# Patient Record
Sex: MALE | Race: WHITE | Employment: OTHER | URBAN - METROPOLITAN AREA
[De-identification: names, ages, dates, MRNs, and addresses within clinical notes are randomized per-mention and may not be internally consistent; named-entity substitution may affect disease eponyms.]

---

## 2017-02-28 ENCOUNTER — HOSPITAL ENCOUNTER (OUTPATIENT)
Dept: WOUND CARE | Age: 82
Discharge: HOME | End: 2017-02-28
Attending: PHYSICAL MEDICINE & REHABILITATION
Payer: MEDICARE

## 2017-02-28 PROCEDURE — 99214 OFFICE O/P EST MOD 30 MIN: CPT

## 2017-02-28 PROCEDURE — 11056 PARNG/CUTG B9 HYPRKR LES 2-4: CPT

## 2018-06-01 PROBLEM — L97.509 FOOT ULCER (HCC): Status: ACTIVE | Noted: 2018-06-01

## 2018-06-19 ENCOUNTER — HOSPITAL ENCOUNTER (OUTPATIENT)
Dept: WOUND CARE | Age: 83
Discharge: HOME | End: 2018-06-19
Attending: PHYSICAL MEDICINE & REHABILITATION
Payer: MEDICARE

## 2018-06-19 PROCEDURE — 99214 OFFICE O/P EST MOD 30 MIN: CPT

## 2019-04-09 PROBLEM — L97.509 FOOT ULCER (HCC): Status: RESOLVED | Noted: 2018-06-01 | Resolved: 2019-04-09

## 2020-08-01 ENCOUNTER — HOME HEALTH ADMISSION (OUTPATIENT)
Dept: HOME HEALTH SERVICES | Facility: HOME HEALTH | Age: 85
End: 2020-08-01
Payer: MEDICARE

## 2020-08-04 ENCOUNTER — HOME CARE VISIT (OUTPATIENT)
Dept: SCHEDULING | Facility: HOME HEALTH | Age: 85
End: 2020-08-04
Payer: MEDICARE

## 2020-08-04 VITALS
TEMPERATURE: 98.2 F | DIASTOLIC BLOOD PRESSURE: 70 MMHG | SYSTOLIC BLOOD PRESSURE: 128 MMHG | HEART RATE: 80 BPM | OXYGEN SATURATION: 98 % | RESPIRATION RATE: 16 BRPM

## 2020-08-04 PROCEDURE — 400013 HH SOC

## 2020-08-04 PROCEDURE — 3331090001 HH PPS REVENUE CREDIT

## 2020-08-04 PROCEDURE — 3331090002 HH PPS REVENUE DEBIT

## 2020-08-04 PROCEDURE — G0299 HHS/HOSPICE OF RN EA 15 MIN: HCPCS

## 2020-08-05 PROCEDURE — 3331090001 HH PPS REVENUE CREDIT

## 2020-08-05 PROCEDURE — 3331090002 HH PPS REVENUE DEBIT

## 2020-08-06 PROCEDURE — 3331090002 HH PPS REVENUE DEBIT

## 2020-08-06 PROCEDURE — 3331090001 HH PPS REVENUE CREDIT

## 2020-08-07 ENCOUNTER — HOME CARE VISIT (OUTPATIENT)
Dept: SCHEDULING | Facility: HOME HEALTH | Age: 85
End: 2020-08-07
Payer: MEDICARE

## 2020-08-07 VITALS
HEART RATE: 75 BPM | OXYGEN SATURATION: 98 % | TEMPERATURE: 98.2 F | SYSTOLIC BLOOD PRESSURE: 130 MMHG | DIASTOLIC BLOOD PRESSURE: 80 MMHG | RESPIRATION RATE: 17 BRPM

## 2020-08-07 PROCEDURE — 3331090002 HH PPS REVENUE DEBIT

## 2020-08-07 PROCEDURE — 3331090001 HH PPS REVENUE CREDIT

## 2020-08-07 PROCEDURE — G0299 HHS/HOSPICE OF RN EA 15 MIN: HCPCS

## 2020-08-08 PROCEDURE — 3331090001 HH PPS REVENUE CREDIT

## 2020-08-08 PROCEDURE — 3331090002 HH PPS REVENUE DEBIT

## 2020-08-09 PROCEDURE — 3331090001 HH PPS REVENUE CREDIT

## 2020-08-09 PROCEDURE — 3331090002 HH PPS REVENUE DEBIT

## 2020-08-10 PROCEDURE — 3331090001 HH PPS REVENUE CREDIT

## 2020-08-10 PROCEDURE — 3331090002 HH PPS REVENUE DEBIT

## 2020-08-11 ENCOUNTER — HOME CARE VISIT (OUTPATIENT)
Dept: SCHEDULING | Facility: HOME HEALTH | Age: 85
End: 2020-08-11
Payer: MEDICARE

## 2020-08-11 VITALS
SYSTOLIC BLOOD PRESSURE: 114 MMHG | DIASTOLIC BLOOD PRESSURE: 72 MMHG | RESPIRATION RATE: 16 BRPM | TEMPERATURE: 98.4 F | HEART RATE: 64 BPM

## 2020-08-11 PROCEDURE — G0299 HHS/HOSPICE OF RN EA 15 MIN: HCPCS

## 2020-08-11 PROCEDURE — 3331090002 HH PPS REVENUE DEBIT

## 2020-08-11 PROCEDURE — G0151 HHCP-SERV OF PT,EA 15 MIN: HCPCS

## 2020-08-11 PROCEDURE — 3331090001 HH PPS REVENUE CREDIT

## 2020-08-12 ENCOUNTER — HOME CARE VISIT (OUTPATIENT)
Dept: SCHEDULING | Facility: HOME HEALTH | Age: 85
End: 2020-08-12
Payer: MEDICARE

## 2020-08-12 PROCEDURE — 3331090001 HH PPS REVENUE CREDIT

## 2020-08-12 PROCEDURE — 3331090002 HH PPS REVENUE DEBIT

## 2020-08-12 PROCEDURE — G0155 HHCP-SVS OF CSW,EA 15 MIN: HCPCS

## 2020-08-13 PROCEDURE — 3331090002 HH PPS REVENUE DEBIT

## 2020-08-13 PROCEDURE — 3331090001 HH PPS REVENUE CREDIT

## 2020-08-14 ENCOUNTER — HOME CARE VISIT (OUTPATIENT)
Dept: SCHEDULING | Facility: HOME HEALTH | Age: 85
End: 2020-08-14
Payer: MEDICARE

## 2020-08-14 VITALS
HEART RATE: 73 BPM | TEMPERATURE: 97.3 F | RESPIRATION RATE: 18 BRPM | SYSTOLIC BLOOD PRESSURE: 124 MMHG | DIASTOLIC BLOOD PRESSURE: 66 MMHG

## 2020-08-14 PROCEDURE — G0157 HHC PT ASSISTANT EA 15: HCPCS

## 2020-08-14 PROCEDURE — G0299 HHS/HOSPICE OF RN EA 15 MIN: HCPCS

## 2020-08-14 PROCEDURE — 3331090001 HH PPS REVENUE CREDIT

## 2020-08-14 PROCEDURE — 3331090002 HH PPS REVENUE DEBIT

## 2020-08-15 PROCEDURE — 3331090002 HH PPS REVENUE DEBIT

## 2020-08-15 PROCEDURE — 3331090001 HH PPS REVENUE CREDIT

## 2020-08-16 PROCEDURE — 3331090002 HH PPS REVENUE DEBIT

## 2020-08-16 PROCEDURE — 3331090001 HH PPS REVENUE CREDIT

## 2020-08-17 ENCOUNTER — HOME CARE VISIT (OUTPATIENT)
Dept: SCHEDULING | Facility: HOME HEALTH | Age: 85
End: 2020-08-17
Payer: MEDICARE

## 2020-08-17 VITALS
TEMPERATURE: 97.8 F | DIASTOLIC BLOOD PRESSURE: 68 MMHG | RESPIRATION RATE: 18 BRPM | SYSTOLIC BLOOD PRESSURE: 104 MMHG | HEART RATE: 66 BPM

## 2020-08-17 PROCEDURE — G0157 HHC PT ASSISTANT EA 15: HCPCS

## 2020-08-17 PROCEDURE — G0299 HHS/HOSPICE OF RN EA 15 MIN: HCPCS

## 2020-08-17 PROCEDURE — 3331090001 HH PPS REVENUE CREDIT

## 2020-08-17 PROCEDURE — 3331090002 HH PPS REVENUE DEBIT

## 2020-08-18 VITALS
SYSTOLIC BLOOD PRESSURE: 106 MMHG | DIASTOLIC BLOOD PRESSURE: 70 MMHG | HEART RATE: 78 BPM | TEMPERATURE: 98 F | OXYGEN SATURATION: 99 % | RESPIRATION RATE: 18 BRPM

## 2020-08-18 PROCEDURE — 3331090002 HH PPS REVENUE DEBIT

## 2020-08-18 PROCEDURE — 3331090001 HH PPS REVENUE CREDIT

## 2020-08-19 ENCOUNTER — HOME CARE VISIT (OUTPATIENT)
Dept: SCHEDULING | Facility: HOME HEALTH | Age: 85
End: 2020-08-19
Payer: MEDICARE

## 2020-08-19 VITALS
RESPIRATION RATE: 18 BRPM | SYSTOLIC BLOOD PRESSURE: 120 MMHG | TEMPERATURE: 98.4 F | DIASTOLIC BLOOD PRESSURE: 72 MMHG | HEART RATE: 72 BPM

## 2020-08-19 PROCEDURE — G0157 HHC PT ASSISTANT EA 15: HCPCS

## 2020-08-19 PROCEDURE — 3331090001 HH PPS REVENUE CREDIT

## 2020-08-19 PROCEDURE — 3331090002 HH PPS REVENUE DEBIT

## 2020-08-20 PROCEDURE — 3331090001 HH PPS REVENUE CREDIT

## 2020-08-20 PROCEDURE — 3331090002 HH PPS REVENUE DEBIT

## 2020-08-21 PROCEDURE — 3331090001 HH PPS REVENUE CREDIT

## 2020-08-21 PROCEDURE — 3331090002 HH PPS REVENUE DEBIT

## 2020-08-22 PROCEDURE — 3331090001 HH PPS REVENUE CREDIT

## 2020-08-22 PROCEDURE — 3331090002 HH PPS REVENUE DEBIT

## 2020-08-23 PROCEDURE — 3331090002 HH PPS REVENUE DEBIT

## 2020-08-23 PROCEDURE — 3331090001 HH PPS REVENUE CREDIT

## 2020-08-24 ENCOUNTER — HOME CARE VISIT (OUTPATIENT)
Dept: SCHEDULING | Facility: HOME HEALTH | Age: 85
End: 2020-08-24
Payer: MEDICARE

## 2020-08-24 VITALS
RESPIRATION RATE: 18 BRPM | SYSTOLIC BLOOD PRESSURE: 124 MMHG | DIASTOLIC BLOOD PRESSURE: 76 MMHG | HEART RATE: 68 BPM | OXYGEN SATURATION: 97 % | TEMPERATURE: 97.8 F

## 2020-08-24 PROCEDURE — 3331090001 HH PPS REVENUE CREDIT

## 2020-08-24 PROCEDURE — 3331090002 HH PPS REVENUE DEBIT

## 2020-08-24 PROCEDURE — G0153 HHCP-SVS OF S/L PATH,EA 15MN: HCPCS

## 2020-08-25 PROCEDURE — 3331090001 HH PPS REVENUE CREDIT

## 2020-08-25 PROCEDURE — 3331090002 HH PPS REVENUE DEBIT

## 2020-08-26 ENCOUNTER — HOME CARE VISIT (OUTPATIENT)
Dept: SCHEDULING | Facility: HOME HEALTH | Age: 85
End: 2020-08-26
Payer: MEDICARE

## 2020-08-26 VITALS
RESPIRATION RATE: 16 BRPM | SYSTOLIC BLOOD PRESSURE: 120 MMHG | HEART RATE: 72 BPM | DIASTOLIC BLOOD PRESSURE: 74 MMHG | TEMPERATURE: 97.5 F | OXYGEN SATURATION: 97 %

## 2020-08-26 PROCEDURE — 3331090001 HH PPS REVENUE CREDIT

## 2020-08-26 PROCEDURE — 3331090002 HH PPS REVENUE DEBIT

## 2020-08-26 PROCEDURE — G0153 HHCP-SVS OF S/L PATH,EA 15MN: HCPCS

## 2020-08-27 ENCOUNTER — HOME CARE VISIT (OUTPATIENT)
Dept: HOME HEALTH SERVICES | Facility: HOME HEALTH | Age: 85
End: 2020-08-27
Payer: MEDICARE

## 2020-08-27 ENCOUNTER — HOME CARE VISIT (OUTPATIENT)
Dept: SCHEDULING | Facility: HOME HEALTH | Age: 85
End: 2020-08-27
Payer: MEDICARE

## 2020-08-27 VITALS
HEART RATE: 72 BPM | RESPIRATION RATE: 18 BRPM | DIASTOLIC BLOOD PRESSURE: 82 MMHG | SYSTOLIC BLOOD PRESSURE: 132 MMHG | TEMPERATURE: 97.7 F

## 2020-08-27 VITALS
SYSTOLIC BLOOD PRESSURE: 130 MMHG | HEART RATE: 72 BPM | OXYGEN SATURATION: 98 % | RESPIRATION RATE: 18 BRPM | DIASTOLIC BLOOD PRESSURE: 82 MMHG | TEMPERATURE: 97.4 F

## 2020-08-27 PROCEDURE — G0157 HHC PT ASSISTANT EA 15: HCPCS

## 2020-08-27 PROCEDURE — 3331090001 HH PPS REVENUE CREDIT

## 2020-08-27 PROCEDURE — G0299 HHS/HOSPICE OF RN EA 15 MIN: HCPCS

## 2020-08-27 PROCEDURE — 3331090002 HH PPS REVENUE DEBIT

## 2020-08-28 PROCEDURE — 3331090002 HH PPS REVENUE DEBIT

## 2020-08-28 PROCEDURE — 3331090001 HH PPS REVENUE CREDIT

## 2020-08-29 PROCEDURE — 3331090001 HH PPS REVENUE CREDIT

## 2020-08-29 PROCEDURE — 3331090002 HH PPS REVENUE DEBIT

## 2020-08-30 PROCEDURE — 3331090002 HH PPS REVENUE DEBIT

## 2020-08-30 PROCEDURE — 3331090001 HH PPS REVENUE CREDIT

## 2020-08-31 PROCEDURE — 3331090002 HH PPS REVENUE DEBIT

## 2020-08-31 PROCEDURE — 3331090001 HH PPS REVENUE CREDIT

## 2020-09-01 PROCEDURE — 3331090001 HH PPS REVENUE CREDIT

## 2020-09-01 PROCEDURE — 3331090002 HH PPS REVENUE DEBIT

## 2020-09-02 ENCOUNTER — HOME CARE VISIT (OUTPATIENT)
Dept: SCHEDULING | Facility: HOME HEALTH | Age: 85
End: 2020-09-02
Payer: MEDICARE

## 2020-09-02 VITALS
RESPIRATION RATE: 18 BRPM | SYSTOLIC BLOOD PRESSURE: 120 MMHG | HEART RATE: 70 BPM | TEMPERATURE: 97.8 F | OXYGEN SATURATION: 98 % | DIASTOLIC BLOOD PRESSURE: 64 MMHG

## 2020-09-02 PROCEDURE — G0299 HHS/HOSPICE OF RN EA 15 MIN: HCPCS

## 2020-09-02 PROCEDURE — 3331090002 HH PPS REVENUE DEBIT

## 2020-09-02 PROCEDURE — 3331090001 HH PPS REVENUE CREDIT

## 2020-09-03 ENCOUNTER — HOME CARE VISIT (OUTPATIENT)
Dept: SCHEDULING | Facility: HOME HEALTH | Age: 85
End: 2020-09-03
Payer: MEDICARE

## 2020-09-03 VITALS
HEART RATE: 70 BPM | DIASTOLIC BLOOD PRESSURE: 76 MMHG | SYSTOLIC BLOOD PRESSURE: 132 MMHG | RESPIRATION RATE: 16 BRPM | TEMPERATURE: 98.2 F

## 2020-09-03 PROCEDURE — 3331090002 HH PPS REVENUE DEBIT

## 2020-09-03 PROCEDURE — 400013 HH SOC

## 2020-09-03 PROCEDURE — 3331090001 HH PPS REVENUE CREDIT

## 2020-09-03 PROCEDURE — G0151 HHCP-SERV OF PT,EA 15 MIN: HCPCS

## 2020-09-04 PROCEDURE — 3331090001 HH PPS REVENUE CREDIT

## 2020-09-04 PROCEDURE — 3331090002 HH PPS REVENUE DEBIT

## 2020-09-05 PROCEDURE — 3331090001 HH PPS REVENUE CREDIT

## 2020-09-05 PROCEDURE — 3331090002 HH PPS REVENUE DEBIT

## 2020-09-06 PROCEDURE — 3331090002 HH PPS REVENUE DEBIT

## 2020-09-06 PROCEDURE — 3331090001 HH PPS REVENUE CREDIT

## 2020-09-07 PROCEDURE — 3331090002 HH PPS REVENUE DEBIT

## 2020-09-07 PROCEDURE — 3331090001 HH PPS REVENUE CREDIT

## 2020-09-08 PROCEDURE — 3331090001 HH PPS REVENUE CREDIT

## 2020-09-08 PROCEDURE — 3331090002 HH PPS REVENUE DEBIT

## 2020-09-09 ENCOUNTER — HOME CARE VISIT (OUTPATIENT)
Dept: SCHEDULING | Facility: HOME HEALTH | Age: 85
End: 2020-09-09
Payer: MEDICARE

## 2020-09-09 PROCEDURE — 3331090002 HH PPS REVENUE DEBIT

## 2020-09-09 PROCEDURE — 3331090001 HH PPS REVENUE CREDIT

## 2020-09-09 PROCEDURE — G0299 HHS/HOSPICE OF RN EA 15 MIN: HCPCS

## 2021-04-11 ENCOUNTER — APPOINTMENT (OUTPATIENT)
Dept: GENERAL RADIOLOGY | Age: 86
End: 2021-04-11
Attending: PHYSICIAN ASSISTANT
Payer: MEDICARE

## 2021-04-11 ENCOUNTER — APPOINTMENT (OUTPATIENT)
Dept: CT IMAGING | Age: 86
End: 2021-04-11
Attending: EMERGENCY MEDICINE
Payer: MEDICARE

## 2021-04-11 ENCOUNTER — HOSPITAL ENCOUNTER (EMERGENCY)
Age: 86
Discharge: HOME | End: 2021-04-11
Attending: EMERGENCY MEDICINE
Payer: MEDICARE

## 2021-04-11 VITALS
HEIGHT: 72 IN | OXYGEN SATURATION: 97 % | HEART RATE: 108 BPM | SYSTOLIC BLOOD PRESSURE: 117 MMHG | WEIGHT: 160 LBS | DIASTOLIC BLOOD PRESSURE: 74 MMHG | RESPIRATION RATE: 16 BRPM | BODY MASS INDEX: 21.67 KG/M2 | TEMPERATURE: 98.3 F

## 2021-04-11 DIAGNOSIS — K59.00 CONSTIPATION, UNSPECIFIED CONSTIPATION TYPE: Primary | ICD-10-CM

## 2021-04-11 LAB
ALBUMIN SERPL-MCNC: 3.3 G/DL (ref 3.2–4.6)
ALBUMIN/GLOB SERPL: 0.9 {RATIO} (ref 1.2–3.5)
ALP SERPL-CCNC: 92 U/L (ref 50–136)
ALT SERPL-CCNC: 26 U/L (ref 12–65)
ANION GAP SERPL CALC-SCNC: 7 MMOL/L (ref 7–16)
AST SERPL-CCNC: 32 U/L (ref 15–37)
BASOPHILS # BLD: 0.1 K/UL (ref 0–0.2)
BASOPHILS NFR BLD: 1 % (ref 0–2)
BILIRUB DIRECT SERPL-MCNC: 0.1 MG/DL
BILIRUB SERPL-MCNC: 0.5 MG/DL (ref 0.2–1.1)
BUN SERPL-MCNC: 25 MG/DL (ref 8–23)
CALCIUM SERPL-MCNC: 8.8 MG/DL (ref 8.3–10.4)
CHLORIDE SERPL-SCNC: 106 MMOL/L (ref 98–107)
CO2 SERPL-SCNC: 25 MMOL/L (ref 21–32)
CREAT SERPL-MCNC: 0.83 MG/DL (ref 0.8–1.5)
DIFFERENTIAL METHOD BLD: ABNORMAL
EOSINOPHIL # BLD: 0.1 K/UL (ref 0–0.8)
EOSINOPHIL NFR BLD: 1 % (ref 0.5–7.8)
ERYTHROCYTE [DISTWIDTH] IN BLOOD BY AUTOMATED COUNT: 13.7 % (ref 11.9–14.6)
GLOBULIN SER CALC-MCNC: 3.8 G/DL (ref 2.3–3.5)
GLUCOSE SERPL-MCNC: 97 MG/DL (ref 65–100)
HCT VFR BLD AUTO: 34.2 % (ref 41.1–50.3)
HGB BLD-MCNC: 11.3 G/DL (ref 13.6–17.2)
IMM GRANULOCYTES # BLD AUTO: 0 K/UL (ref 0–0.5)
IMM GRANULOCYTES NFR BLD AUTO: 0 % (ref 0–5)
LYMPHOCYTES # BLD: 1 K/UL (ref 0.5–4.6)
LYMPHOCYTES NFR BLD: 12 % (ref 13–44)
MCH RBC QN AUTO: 28.7 PG (ref 26.1–32.9)
MCHC RBC AUTO-ENTMCNC: 33 G/DL (ref 31.4–35)
MCV RBC AUTO: 86.8 FL (ref 79.6–97.8)
MONOCYTES # BLD: 1 K/UL (ref 0.1–1.3)
MONOCYTES NFR BLD: 11 % (ref 4–12)
NEUTS SEG # BLD: 6.8 K/UL (ref 1.7–8.2)
NEUTS SEG NFR BLD: 76 % (ref 43–78)
NRBC # BLD: 0 K/UL (ref 0–0.2)
PLATELET # BLD AUTO: 356 K/UL (ref 150–450)
PMV BLD AUTO: 9.7 FL (ref 9.4–12.3)
POTASSIUM SERPL-SCNC: 3.8 MMOL/L (ref 3.5–5.1)
PROT SERPL-MCNC: 7.1 G/DL (ref 6.3–8.2)
RBC # BLD AUTO: 3.94 M/UL (ref 4.23–5.6)
SODIUM SERPL-SCNC: 138 MMOL/L (ref 136–145)
WBC # BLD AUTO: 9.1 K/UL (ref 4.3–11.1)

## 2021-04-11 PROCEDURE — 80076 HEPATIC FUNCTION PANEL: CPT

## 2021-04-11 PROCEDURE — 74011250637 HC RX REV CODE- 250/637: Performed by: EMERGENCY MEDICINE

## 2021-04-11 PROCEDURE — 80048 BASIC METABOLIC PNL TOTAL CA: CPT

## 2021-04-11 PROCEDURE — 99284 EMERGENCY DEPT VISIT MOD MDM: CPT

## 2021-04-11 PROCEDURE — 74177 CT ABD & PELVIS W/CONTRAST: CPT

## 2021-04-11 PROCEDURE — 74019 RADEX ABDOMEN 2 VIEWS: CPT

## 2021-04-11 PROCEDURE — 74011000258 HC RX REV CODE- 258: Performed by: EMERGENCY MEDICINE

## 2021-04-11 PROCEDURE — 85025 COMPLETE CBC W/AUTO DIFF WBC: CPT

## 2021-04-11 PROCEDURE — 81003 URINALYSIS AUTO W/O SCOPE: CPT

## 2021-04-11 PROCEDURE — 74011000636 HC RX REV CODE- 636: Performed by: EMERGENCY MEDICINE

## 2021-04-11 RX ORDER — GLYCERIN ADULT
1 SUPPOSITORY, RECTAL RECTAL
Status: COMPLETED | OUTPATIENT
Start: 2021-04-11 | End: 2021-04-11

## 2021-04-11 RX ORDER — SODIUM CHLORIDE 0.9 % (FLUSH) 0.9 %
5-40 SYRINGE (ML) INJECTION AS NEEDED
Status: DISCONTINUED | OUTPATIENT
Start: 2021-04-11 | End: 2021-04-11 | Stop reason: HOSPADM

## 2021-04-11 RX ORDER — SODIUM CHLORIDE 0.9 % (FLUSH) 0.9 %
5-40 SYRINGE (ML) INJECTION EVERY 8 HOURS
Status: DISCONTINUED | OUTPATIENT
Start: 2021-04-11 | End: 2021-04-11 | Stop reason: HOSPADM

## 2021-04-11 RX ORDER — SODIUM CHLORIDE 0.9 % (FLUSH) 0.9 %
10 SYRINGE (ML) INJECTION
Status: COMPLETED | OUTPATIENT
Start: 2021-04-11 | End: 2021-04-11

## 2021-04-11 RX ADMIN — GLYCERIN 1 SUPPOSITORY: 2 SUPPOSITORY RECTAL at 16:59

## 2021-04-11 RX ADMIN — Medication 10 ML: at 16:33

## 2021-04-11 RX ADMIN — IOPAMIDOL 100 ML: 755 INJECTION, SOLUTION INTRAVENOUS at 16:33

## 2021-04-11 RX ADMIN — DIATRIZOATE MEGLUMINE AND DIATRIZOATE SODIUM 15 ML: 660; 100 LIQUID ORAL; RECTAL at 15:20

## 2021-04-11 RX ADMIN — SODIUM CHLORIDE 100 ML: 900 INJECTION, SOLUTION INTRAVENOUS at 16:33

## 2021-04-11 NOTE — ED TRIAGE NOTES
Pt to ER with EMS from nursing home who states pt was on medication to have BMs but they started causing diarrhea and now pt is impacted. Pt has dementia and does not answer questions. Masked in triage.

## 2021-04-11 NOTE — ED PROVIDER NOTES
26-year-old white male with advanced Alzheimer's dementia sent from nursing facility due to increased confusion and weakness according to the patient's son the patient can usually ambulate without assistance but now cannot do so. Staff expressed concern to the patient son that he may be constipated. No reports of vomiting or fever. The patient has severe dementia and cannot contribute to history of present illness. The history is provided by the patient. Constipation   Pertinent negatives include no constipation.         Past Medical History:   Diagnosis Date    Anxiety disorder 7/7/2015    Cancer ([facility] [address]     skin    Chest pain     Depression     Diabetes (HCC)     borderline    Elevated LFTs     Transient Elevated Lft's    Hypercholesterolemia     Hyperglycemia     Hyperlipidemia 7/7/2015    Hypertension     Ischemic     Ischemic ulceration    Neuropathy, lower extremity     Ulcer     Bleeding ulcers       Past Surgical History:   Procedure Laterality Date    HX COLONOSCOPY      HX GI      bleeding ulcer    HX HERNIA REPAIR      double    HX ORTHOPAEDIC      wart removed    VASCULAR SURGERY PROCEDURE UNLIST Left 4/10/15    LE arteriogram    VASCULAR SURGERY PROCEDURE UNLIST Left 5-26-15    arteriogram         Family History:   Problem Relation Age of Onset    Lung Disease Mother     Anxiety Father     Heart Disease Father         CHF       Social History     Socioeconomic History    Marital status:      Spouse name: Not on file    Number of children: Not on file    Years of education: Not on file    Highest education level: Not on file   Occupational History    Not on file   Social Needs    Financial resource strain: Not on file    Food insecurity     Worry: Not on file     Inability: Not on file    Transportation needs     Medical: Not on file     Non-medical: Not on file   Tobacco Use    Smoking status: Never Smoker    Smokeless tobacco: Never Used   Substance and Sexual Activity    Alcohol use: No    Drug use: Not on file    Sexual activity: Not on file   Lifestyle    Physical activity     Days per week: Not on file     Minutes per session: Not on file    Stress: Not on file   Relationships    Social connections     Talks on phone: Not on file     Gets together: Not on file     Attends Mormon service: Not on file     Active member of club or organization: Not on file     Attends meetings of clubs or organizations: Not on file     Relationship status: Not on file    Intimate partner violence     Fear of current or ex partner: Not on file     Emotionally abused: Not on file     Physically abused: Not on file     Forced sexual activity: Not on file   Other Topics Concern    Not on file   Social History Narrative    Not on file         ALLERGIES: Olive extract, Cough drops [menthol], Daypro [oxaprozin], and Tigan [trimethobenzamide]    Review of Systems   Unable to perform ROS: Dementia   Gastrointestinal: Negative for constipation. Vitals:    04/11/21 1403   BP: 117/74   Pulse: (!) 108   Resp: 16   Temp: 98.3 °F (36.8 °C)   SpO2: 97%   Weight: 72.6 kg (160 lb)   Height: 6' (1.829 m)            Physical Exam  Vitals signs and nursing note reviewed. Constitutional:       General: He is not in acute distress. Appearance: Normal appearance. He is not toxic-appearing. Comments: Patient is very pleasant and interactive however was unable to answer questions appropriately or follow commands. HENT:      Head: Normocephalic and atraumatic. Nose: Nose normal.      Mouth/Throat:      Mouth: Mucous membranes are moist.      Pharynx: Oropharynx is clear. Eyes:      Pupils: Pupils are equal, round, and reactive to light. Neck:      Musculoskeletal: Normal range of motion and neck supple. Cardiovascular:      Rate and Rhythm: Normal rate and regular rhythm. Pulmonary:      Effort: Pulmonary effort is normal.      Breath sounds: Normal breath sounds. Abdominal:      Palpations: Abdomen is soft. Comments: Tenderness to right mid abdomen. No rebound or rigidity   Musculoskeletal: Normal range of motion. Skin:     General: Skin is warm and dry. Neurological:      Mental Status: He is alert. Mental status is at baseline. Psychiatric:         Mood and Affect: Mood normal.         Behavior: Behavior normal.          MDM  Number of Diagnoses or Management Options  Diagnosis management comments: Blood work is unremarkable. Urinalysis normal.  Abdominal x-ray showed a few air-fluid levels. 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  Patient is very comfortable in appearance and appears safe for discharge home. Was treated with glycerin suppository for fecal impaction.          Amount and/or Complexity of Data Reviewed  Clinical lab tests: ordered and reviewed  Tests in the radiology section of CPT®: ordered and reviewed  Tests in the medicine section of CPT®: ordered and reviewed  Independent visualization of images, tracings, or specimens: yes    Risk of Complications, Morbidity, and/or Mortality  Presenting problems: moderate  Diagnostic procedures: moderate  Management options: moderate           Procedures

## 2021-04-11 NOTE — ED NOTES
I have reviewed discharge instructions with the patient. The patient verbalized understanding. Patient left ED via Discharge Method: wheelchair to Home with self. Opportunity for questions and clarification provided. Patient given 0 scripts. To continue your aftercare when you leave the hospital, you may receive an automated call from our care team to check in on how you are doing. This is a free service and part of our promise to provide the best care and service to meet your aftercare needs.  If you have questions, or wish to unsubscribe from this service please call [phone]. Thank you for Choosing our [facility] Emergency Department.

## 2022-02-24 ENCOUNTER — APPOINTMENT (OUTPATIENT)
Dept: CT IMAGING | Age: 87
End: 2022-02-24
Attending: PHYSICIAN ASSISTANT
Payer: MEDICARE

## 2022-02-24 ENCOUNTER — HOSPITAL ENCOUNTER (EMERGENCY)
Age: 87
Discharge: NURSING, REHAB OR LONG-TERM CARE | End: 2022-02-24
Attending: STUDENT IN AN ORGANIZED HEALTH CARE EDUCATION/TRAINING PROGRAM
Payer: MEDICARE

## 2022-02-24 ENCOUNTER — APPOINTMENT (OUTPATIENT)
Dept: GENERAL RADIOLOGY | Age: 87
End: 2022-02-24
Attending: PHYSICIAN ASSISTANT
Payer: MEDICARE

## 2022-02-24 VITALS
RESPIRATION RATE: 16 BRPM | BODY MASS INDEX: 21.7 KG/M2 | TEMPERATURE: 98.7 F | HEART RATE: 66 BPM | SYSTOLIC BLOOD PRESSURE: 166 MMHG | OXYGEN SATURATION: 98 % | WEIGHT: 160 LBS | DIASTOLIC BLOOD PRESSURE: 73 MMHG

## 2022-02-24 DIAGNOSIS — R33.9 URINARY RETENTION: Primary | ICD-10-CM

## 2022-02-24 LAB
ALBUMIN SERPL-MCNC: 3.4 G/DL (ref 3.2–4.6)
ALBUMIN/GLOB SERPL: 0.9 {RATIO} (ref 1.2–3.5)
ALP SERPL-CCNC: 88 U/L (ref 50–136)
ALT SERPL-CCNC: 18 U/L (ref 12–65)
ANION GAP SERPL CALC-SCNC: 8 MMOL/L (ref 7–16)
AST SERPL-CCNC: 18 U/L (ref 15–37)
BACTERIA URNS QL MICRO: 0 /HPF
BASOPHILS # BLD: 0.1 K/UL (ref 0–0.2)
BASOPHILS NFR BLD: 2 % (ref 0–2)
BILIRUB SERPL-MCNC: 0.4 MG/DL (ref 0.2–1.1)
BUN SERPL-MCNC: 18 MG/DL (ref 8–23)
CALCIUM SERPL-MCNC: 8.6 MG/DL (ref 8.3–10.4)
CASTS URNS QL MICRO: 0 /LPF
CHLORIDE SERPL-SCNC: 105 MMOL/L (ref 98–107)
CO2 SERPL-SCNC: 27 MMOL/L (ref 21–32)
CREAT SERPL-MCNC: 0.95 MG/DL (ref 0.8–1.5)
DIFFERENTIAL METHOD BLD: ABNORMAL
EOSINOPHIL # BLD: 0.2 K/UL (ref 0–0.8)
EOSINOPHIL NFR BLD: 5 % (ref 0.5–7.8)
EPI CELLS #/AREA URNS HPF: 0 /HPF
ERYTHROCYTE [DISTWIDTH] IN BLOOD BY AUTOMATED COUNT: 14.3 % (ref 11.9–14.6)
GLOBULIN SER CALC-MCNC: 3.8 G/DL (ref 2.3–3.5)
GLUCOSE SERPL-MCNC: 117 MG/DL (ref 65–100)
HCT VFR BLD AUTO: 34.6 % (ref 41.1–50.3)
HGB BLD-MCNC: 11 G/DL (ref 13.6–17.2)
IMM GRANULOCYTES # BLD AUTO: 0 K/UL (ref 0–0.5)
IMM GRANULOCYTES NFR BLD AUTO: 0 % (ref 0–5)
LYMPHOCYTES # BLD: 1.3 K/UL (ref 0.5–4.6)
LYMPHOCYTES NFR BLD: 28 % (ref 13–44)
MCH RBC QN AUTO: 28.5 PG (ref 26.1–32.9)
MCHC RBC AUTO-ENTMCNC: 31.8 G/DL (ref 31.4–35)
MCV RBC AUTO: 89.6 FL (ref 79.6–97.8)
MONOCYTES # BLD: 0.8 K/UL (ref 0.1–1.3)
MONOCYTES NFR BLD: 17 % (ref 4–12)
NEUTS SEG # BLD: 2.2 K/UL (ref 1.7–8.2)
NEUTS SEG NFR BLD: 47 % (ref 43–78)
NRBC # BLD: 0 K/UL (ref 0–0.2)
PLATELET # BLD AUTO: 237 K/UL (ref 150–450)
PMV BLD AUTO: 9.3 FL (ref 9.4–12.3)
POTASSIUM SERPL-SCNC: 3.6 MMOL/L (ref 3.5–5.1)
PROT SERPL-MCNC: 7.2 G/DL (ref 6.3–8.2)
RBC # BLD AUTO: 3.86 M/UL (ref 4.23–5.6)
RBC #/AREA URNS HPF: NORMAL /HPF
SODIUM SERPL-SCNC: 140 MMOL/L (ref 136–145)
WBC # BLD AUTO: 4.7 K/UL (ref 4.3–11.1)
WBC URNS QL MICRO: 0 /HPF

## 2022-02-24 PROCEDURE — 85025 COMPLETE CBC W/AUTO DIFF WBC: CPT

## 2022-02-24 PROCEDURE — 99284 EMERGENCY DEPT VISIT MOD MDM: CPT

## 2022-02-24 PROCEDURE — 96374 THER/PROPH/DIAG INJ IV PUSH: CPT

## 2022-02-24 PROCEDURE — 71045 X-RAY EXAM CHEST 1 VIEW: CPT

## 2022-02-24 PROCEDURE — 96372 THER/PROPH/DIAG INJ SC/IM: CPT

## 2022-02-24 PROCEDURE — 74011250636 HC RX REV CODE- 250/636: Performed by: PHYSICIAN ASSISTANT

## 2022-02-24 PROCEDURE — 74011250636 HC RX REV CODE- 250/636: Performed by: STUDENT IN AN ORGANIZED HEALTH CARE EDUCATION/TRAINING PROGRAM

## 2022-02-24 PROCEDURE — 81015 MICROSCOPIC EXAM OF URINE: CPT

## 2022-02-24 PROCEDURE — 80053 COMPREHEN METABOLIC PANEL: CPT

## 2022-02-24 PROCEDURE — 70450 CT HEAD/BRAIN W/O DYE: CPT

## 2022-02-24 PROCEDURE — 74011000250 HC RX REV CODE- 250: Performed by: PHYSICIAN ASSISTANT

## 2022-02-24 RX ORDER — LORAZEPAM 2 MG/ML
2 INJECTION INTRAMUSCULAR
Status: DISCONTINUED | OUTPATIENT
Start: 2022-02-24 | End: 2022-02-24

## 2022-02-24 RX ORDER — LORAZEPAM 2 MG/ML
1 INJECTION INTRAMUSCULAR
Status: COMPLETED | OUTPATIENT
Start: 2022-02-24 | End: 2022-02-24

## 2022-02-24 RX ORDER — SODIUM CHLORIDE 0.9 % (FLUSH) 0.9 %
10 SYRINGE (ML) INJECTION
Status: DISCONTINUED | OUTPATIENT
Start: 2022-02-24 | End: 2022-02-25 | Stop reason: HOSPADM

## 2022-02-24 RX ORDER — HALOPERIDOL 5 MG/ML
5 INJECTION INTRAMUSCULAR
Status: COMPLETED | OUTPATIENT
Start: 2022-02-24 | End: 2022-02-24

## 2022-02-24 RX ADMIN — HALOPERIDOL LACTATE 5 MG: 5 INJECTION, SOLUTION INTRAMUSCULAR at 19:34

## 2022-02-24 RX ADMIN — LORAZEPAM 1 MG: 2 INJECTION INTRAMUSCULAR; INTRAVENOUS at 21:36

## 2022-02-24 RX ADMIN — WATER 20 MG: 1 INJECTION INTRAMUSCULAR; INTRAVENOUS; SUBCUTANEOUS at 20:17

## 2022-02-24 NOTE — ED PROVIDER NOTES
71-year-old male presents to emergency room by EMS for chief complaint of altered mental status. Was evaluated by the SNF nurse practitioner, she notes some increased altered mental status, patient does have severe dementia however she suspects he might have a UTI. He is otherwise acting himself. Not complaining of any specific medical complaints. HPI severely limited due to his dementia and is taken from the EMS.            Past Medical History:   Diagnosis Date    Anxiety disorder 7/7/2015    Cancer ([address]     skin    Chest pain     Depression     Diabetes (HCC)     borderline    Elevated LFTs     Transient Elevated Lft's    Hypercholesterolemia     Hyperglycemia     Hyperlipidemia 7/7/2015    Hypertension     Ischemic     Ischemic ulceration    Neuropathy, lower extremity     Ulcer     Bleeding ulcers       Past Surgical History:   Procedure Laterality Date    HX COLONOSCOPY      HX GI      bleeding ulcer    HX HERNIA REPAIR      double    HX ORTHOPAEDIC      wart removed    VASCULAR SURGERY PROCEDURE UNLIST Left 4/10/15    LE arteriogram    VASCULAR SURGERY PROCEDURE UNLIST Left 5-26-15    arteriogram         Family History:   Problem Relation Age of Onset    Lung Disease Mother     Anxiety Father     Heart Disease Father         CHF       Social History     Socioeconomic History    Marital status:      Spouse name: Not on file    Number of children: Not on file    Years of education: Not on file    Highest education level: Not on file   Occupational History    Not on file   Tobacco Use    Smoking status: Never Smoker    Smokeless tobacco: Never Used   Substance and Sexual Activity    Alcohol use: No    Drug use: Not on file    Sexual activity: Not on file   Other Topics Concern    Not on file   Social History Narrative    Not on file     Social Determinants of Health     Financial Resource Strain:     Difficulty of Paying Living Expenses: Not on file   Food Insecurity:     Worried About Running Out of Food in the Last Year: Not on file    Sofia of Food in the Last Year: Not on file   Transportation Needs:     Lack of Transportation (Medical): Not on file    Lack of Transportation (Non-Medical): Not on file   Physical Activity:     Days of Exercise per Week: Not on file    Minutes of Exercise per Session: Not on file   Stress:     Feeling of Stress : Not on file   Social Connections:     Frequency of Communication with Friends and Family: Not on file    Frequency of Social Gatherings with Friends and Family: Not on file    Attends Jewish Services: Not on file    Active Member of [address] [place] [address] [employer] or Organizations: Not on file    Attends Club or Organization Meetings: Not on file    Marital Status: Not on file   Intimate Partner Violence:     Fear of Current or Ex-Partner: Not on file    Emotionally Abused: Not on file    Physically Abused: Not on file    Sexually Abused: Not on file   Housing Stability:     Unable to Pay for Housing in the Last Year: Not on file    Number of [place] in the Last Year: Not on file    Unstable Housing in the Last Year: Not on file         ALLERGIES: Olive extract, Cough drops [menthol], Daypro [oxaprozin], and Tigan [trimethobenzamide]    Review of Systems   Unable to perform ROS: Dementia       There were no vitals filed for this visit. Physical Exam  Vitals and nursing note reviewed. Constitutional:       Appearance: Normal appearance. He is not ill-appearing or toxic-appearing. HENT:      Head: Normocephalic and atraumatic. Nose: Nose normal.      Mouth/Throat:      Mouth: Mucous membranes are moist.   Pulmonary:      Effort: Pulmonary effort is normal. No respiratory distress. Breath sounds: Normal breath sounds. No wheezing or rales. Abdominal:      Tenderness: There is no abdominal tenderness. Skin:     General: Skin is warm and dry.           MDM         Procedures

## 2022-02-24 NOTE — ED TRIAGE NOTES
Pt arrived to ED via EMS from [facility]. Per EMS pt with history of dementia. Per EMS pt to be evaluated for UTI.

## 2022-02-24 NOTE — ED PROVIDER NOTES
40-year-old male patient with history of dementia presenting for altered mental status and worsening combative-like behavior, initially evaluated by PA. Suspicion for UTI per EMS reports with similar symptoms in the past at time of UTI diagnosis. Patient unable to provide any reliable information at this time secondary to dementia.            Past Medical History:   Diagnosis Date    Anxiety disorder 7/7/2015    Cancer ([address]     skin    Chest pain     Depression     Diabetes (HCC)     borderline    Elevated LFTs     Transient Elevated Lft's    Hypercholesterolemia     Hyperglycemia     Hyperlipidemia 7/7/2015    Hypertension     Ischemic     Ischemic ulceration    Neuropathy, lower extremity     Ulcer     Bleeding ulcers       Past Surgical History:   Procedure Laterality Date    HX COLONOSCOPY      HX GI      bleeding ulcer    HX HERNIA REPAIR      double    HX ORTHOPAEDIC      wart removed    VASCULAR SURGERY PROCEDURE UNLIST Left 4/10/15    LE arteriogram    VASCULAR SURGERY PROCEDURE UNLIST Left 5-26-15    arteriogram         Family History:   Problem Relation Age of Onset    Lung Disease Mother     Anxiety Father     Heart Disease Father         CHF       Social History     Socioeconomic History    Marital status:      Spouse name: Not on file    Number of children: Not on file    Years of education: Not on file    Highest education level: Not on file   Occupational History    Not on file   Tobacco Use    Smoking status: Never Smoker    Smokeless tobacco: Never Used   Substance and Sexual Activity    Alcohol use: No    Drug use: Not on file    Sexual activity: Not on file   Other Topics Concern    Not on file   Social History Narrative    Not on file     Social Determinants of Health     Financial Resource Strain:     Difficulty of Paying Living Expenses: Not on file   Food Insecurity:     Worried About Running Out of Food in the Last Year: Not on file    Sofia major Food in the Last Year: Not on file   Transportation Needs:     Lack of Transportation (Medical): Not on file    Lack of Transportation (Non-Medical): Not on file   Physical Activity:     Days of Exercise per Week: Not on file    Minutes of Exercise per Session: Not on file   Stress:     Feeling of Stress : Not on file   Social Connections:     Frequency of Communication with Friends and Family: Not on file    Frequency of Social Gatherings with Friends and Family: Not on file    Attends Judaism Services: Not on file    Active Member of [address] [place] [address] [employer] or Organizations: Not on file    Attends Club or Organization Meetings: Not on file    Marital Status: Not on file   Intimate Partner Violence:     Fear of Current or Ex-Partner: Not on file    Emotionally Abused: Not on file    Physically Abused: Not on file    Sexually Abused: Not on file   Housing Stability:     Unable to Pay for Housing in the Last Year: Not on file    Number of [place] in the Last Year: Not on file    Unstable Housing in the Last Year: Not on file         ALLERGIES: Olive extract, Cough drops [menthol], Daypro [oxaprozin], and Tigan [trimethobenzamide]    Review of Systems   Unable to perform ROS: Dementia       Vitals:    02/24/22 1730   BP: (!) 150/82   Pulse: 66   Resp: 16   Temp: 98.7 °F (37.1 °C)   SpO2: 100%   Weight: 72.6 kg (160 lb)            Physical Exam  Vitals and nursing note reviewed. Constitutional:       General: He is not in acute distress. Appearance: He is well-developed. He is not diaphoretic. Comments: Elderly male patient, visibly confused consistent with dementia. Attempting to climb out of bed. HENT:      Head: Normocephalic and atraumatic. Right Ear: External ear normal.      Left Ear: External ear normal.      Nose: Nose normal.   Eyes:      Pupils: Pupils are equal, round, and reactive to light. Cardiovascular:      Rate and Rhythm: Normal rate and regular rhythm.       Heart sounds: Normal heart sounds. No murmur heard. No friction rub. No gallop. Pulmonary:      Effort: Pulmonary effort is normal. No respiratory distress. Breath sounds: Normal breath sounds. No stridor. No decreased breath sounds, wheezing, rhonchi or rales. Chest:      Chest wall: No tenderness. Abdominal:      General: There is no distension. Palpations: Abdomen is soft. There is no mass. Tenderness: There is abdominal tenderness in the suprapubic area. There is no guarding or rebound. Hernia: No hernia is present. Comments: Grimace with palpation of the suprapubic region which is a full on my exam consistent with bladder distention peer   Musculoskeletal:         General: No tenderness or deformity. Normal range of motion. Cervical back: Normal range of motion. Skin:     General: Skin is warm and dry. Neurological:      Mental Status: He is alert and oriented to person, place, and time. Cranial Nerves: No cranial nerve deficit. MDM  Number of Diagnoses or Management Options  Urinary retention: new and requires workup  Diagnosis management comments: Patient initially evaluated by PA, 70-year-old male with history of dementia presenting with worsening combative-like behavior, concern for urinary retention versus UTI. Ultrasound imaging performed at bedside shows bladder distention. Orders placed for Richter catheterization. Patient's labs appear stable, he is much more calm and cooperative after Richter catheter placement and urinary retention has resolved. No indication for admission, will plan to discharge back to his facility. Referral for urology evaluation has been made as well.        Amount and/or Complexity of Data Reviewed  Clinical lab tests: ordered and reviewed  Tests in the radiology section of CPT®: ordered and reviewed  Tests in the medicine section of CPT®: ordered and reviewed  Independent visualization of images, tracings, or specimens: yes    Risk of Complications, Morbidity, and/or Mortality  Presenting problems: moderate  Diagnostic procedures: low  Management options: moderate    Patient Progress  Patient progress: stable    ED Course as of 03/09/22 1013   Thu Feb 24, 2022   248 80year-old male patient which is seen by this practice provider for altered mental status with concern for UTI. Patient was found to have significant bladder distention, initially very difficult to place Richter catheter. Patient was very agitated and uncooperative. Required multiple rounds of sedative medication. Eventually a coudé tip catheter was placed with 750 cc of clear yellow urine drained from her bladder almost immediately. Patient appears much more comfortable. Labs in process. [BR]      ED Course User Index  [BR] Prince Emma DO       Bedside US    Date/Time: 3/9/2022 10:15 AM  Performed by: Prince Emma DO  Authorized by: Prince Emma DO     Emergent situation    Performed by: Attending  Type of procedure:   Focused renal/urinary tract  Indications:  Abdominal pain and urinary retention  Transverse bladder:  Adequate  Sagittal bladder:  Adequate  Bladder size:  Distended

## 2022-02-25 ENCOUNTER — HOSPITAL ENCOUNTER (EMERGENCY)
Age: 87
Discharge: HOME | End: 2022-02-25
Attending: EMERGENCY MEDICINE
Payer: MEDICARE

## 2022-02-25 ENCOUNTER — APPOINTMENT (OUTPATIENT)
Dept: CT IMAGING | Age: 87
End: 2022-02-25
Attending: EMERGENCY MEDICINE
Payer: MEDICARE

## 2022-02-25 VITALS
OXYGEN SATURATION: 92 % | BODY MASS INDEX: 21.7 KG/M2 | DIASTOLIC BLOOD PRESSURE: 68 MMHG | HEART RATE: 81 BPM | TEMPERATURE: 97.7 F | WEIGHT: 160 LBS | SYSTOLIC BLOOD PRESSURE: 106 MMHG | RESPIRATION RATE: 16 BRPM

## 2022-02-25 DIAGNOSIS — F03.90 DEMENTIA WITHOUT BEHAVIORAL DISTURBANCE, UNSPECIFIED DEMENTIA TYPE: Primary | ICD-10-CM

## 2022-02-25 DIAGNOSIS — R33.9 URINARY RETENTION: ICD-10-CM

## 2022-02-25 LAB
ALBUMIN SERPL-MCNC: 3.3 G/DL (ref 3.2–4.6)
ALBUMIN/GLOB SERPL: 0.9 {RATIO} (ref 1.2–3.5)
ALP SERPL-CCNC: 90 U/L (ref 50–136)
ALT SERPL-CCNC: 19 U/L (ref 12–65)
ANION GAP SERPL CALC-SCNC: 4 MMOL/L (ref 7–16)
AST SERPL-CCNC: 22 U/L (ref 15–37)
BASOPHILS # BLD: 0.1 K/UL (ref 0–0.2)
BASOPHILS NFR BLD: 1 % (ref 0–2)
BILIRUB SERPL-MCNC: 0.6 MG/DL (ref 0.2–1.1)
BUN SERPL-MCNC: 14 MG/DL (ref 8–23)
CALCIUM SERPL-MCNC: 8.9 MG/DL (ref 8.3–10.4)
CHLORIDE SERPL-SCNC: 108 MMOL/L (ref 98–107)
CO2 SERPL-SCNC: 29 MMOL/L (ref 21–32)
CREAT SERPL-MCNC: 0.7 MG/DL (ref 0.8–1.5)
DIFFERENTIAL METHOD BLD: ABNORMAL
EOSINOPHIL # BLD: 0.1 K/UL (ref 0–0.8)
EOSINOPHIL NFR BLD: 2 % (ref 0.5–7.8)
ERYTHROCYTE [DISTWIDTH] IN BLOOD BY AUTOMATED COUNT: 14.2 % (ref 11.9–14.6)
GLOBULIN SER CALC-MCNC: 3.7 G/DL (ref 2.3–3.5)
GLUCOSE SERPL-MCNC: 104 MG/DL (ref 65–100)
HCT VFR BLD AUTO: 35.6 % (ref 41.1–50.3)
HGB BLD-MCNC: 11.1 G/DL (ref 13.6–17.2)
IMM GRANULOCYTES # BLD AUTO: 0 K/UL (ref 0–0.5)
IMM GRANULOCYTES NFR BLD AUTO: 0 % (ref 0–5)
LACTATE SERPL-SCNC: 1 MMOL/L (ref 0.4–2)
LYMPHOCYTES # BLD: 1.2 K/UL (ref 0.5–4.6)
LYMPHOCYTES NFR BLD: 18 % (ref 13–44)
MAGNESIUM SERPL-MCNC: 2.3 MG/DL (ref 1.8–2.4)
MCH RBC QN AUTO: 28.2 PG (ref 26.1–32.9)
MCHC RBC AUTO-ENTMCNC: 31.2 G/DL (ref 31.4–35)
MCV RBC AUTO: 90.4 FL (ref 79.6–97.8)
MONOCYTES # BLD: 1.6 K/UL (ref 0.1–1.3)
MONOCYTES NFR BLD: 24 % (ref 4–12)
NEUTS SEG # BLD: 3.8 K/UL (ref 1.7–8.2)
NEUTS SEG NFR BLD: 55 % (ref 43–78)
NRBC # BLD: 0 K/UL (ref 0–0.2)
PLATELET # BLD AUTO: 248 K/UL (ref 150–450)
PMV BLD AUTO: 9.3 FL (ref 9.4–12.3)
POTASSIUM SERPL-SCNC: 4.2 MMOL/L (ref 3.5–5.1)
PROT SERPL-MCNC: 7 G/DL (ref 6.3–8.2)
RBC # BLD AUTO: 3.94 M/UL (ref 4.23–5.6)
SODIUM SERPL-SCNC: 141 MMOL/L (ref 138–145)
WBC # BLD AUTO: 6.8 K/UL (ref 4.3–11.1)

## 2022-02-25 PROCEDURE — 83605 ASSAY OF LACTIC ACID: CPT

## 2022-02-25 PROCEDURE — 85025 COMPLETE CBC W/AUTO DIFF WBC: CPT

## 2022-02-25 PROCEDURE — 99284 EMERGENCY DEPT VISIT MOD MDM: CPT

## 2022-02-25 PROCEDURE — 74011250637 HC RX REV CODE- 250/637: Performed by: EMERGENCY MEDICINE

## 2022-02-25 PROCEDURE — 80053 COMPREHEN METABOLIC PANEL: CPT

## 2022-02-25 PROCEDURE — 70450 CT HEAD/BRAIN W/O DYE: CPT

## 2022-02-25 PROCEDURE — 83735 ASSAY OF MAGNESIUM: CPT

## 2022-02-25 RX ORDER — TAMSULOSIN HYDROCHLORIDE 0.4 MG/1
0.4 CAPSULE ORAL DAILY
Qty: 15 CAPSULE | Refills: 0 | Status: SHIPPED | OUTPATIENT
Start: 2022-02-25 | End: 2022-03-12

## 2022-02-25 RX ORDER — TAMSULOSIN HYDROCHLORIDE 0.4 MG/1
0.4 CAPSULE ORAL ONCE
Status: COMPLETED | OUTPATIENT
Start: 2022-02-25 | End: 2022-02-25

## 2022-02-25 RX ADMIN — TAMSULOSIN HYDROCHLORIDE 0.4 MG: 0.4 CAPSULE ORAL at 16:22

## 2022-02-25 NOTE — ED PROVIDER NOTES
66-year-old gentleman with pretty profound dementia presents from a memory care second day in a row to the ER for evaluation. Yesterday he was thought to be more incoherent than usual, his son is not quite sure how they would determine the, nevertheless he was seen in the ER at [facility] [address], unable to void had a urine Richter catheter placed which showed about 1200 cc of urine, it is unclear if he was truly in retention, or just not comprehending the instructions to void. No less, patient was sent home with a Richter and a leg bag, but son indicates that the ER memory care unit is not able to manage medical devices such as a Richter, son reports patient has had no previous history with prostate problems or urinary retention. Cath urine yesterday reportedly negative for infection    Patient able to stand pivot transfer from EMS stretcher to bed, Answering questions seemingly appropriate for EMS consider his dementia, son reports that usually the patient does not even recognize him as the patient is 1 and only child, but does seem to convey a sense that he knows he \"should\" know this person (his son). Son is aware of any fevers, cough, URI, vomiting, diarrhea. Son states his dad is roughly at baseline right now.  ==================  Spoke to staff at his memory care unit/assisted living who indicates that yesterday afternoon patient was far from his baseline, became very agitated, patient not eating much yesterday at 1 point was throwing objects around, and then suddenly sat down and just started vomiting. Patient ambulates on his own without a walker is very active, engaged with activities, meticulous to see colors of various coloring books, usually has a very good appetite eating all his food himself. Yesterday afternoon was a significant departure from home so they sent him to the ER.     Patient was not started on any Flomax for the apparent urinary retention yesterday,  Staff indicates still today he is not his usual gregarious self staff indicates that while the patient may not know his son's name Dharmesh Monique may not know that his son is in fact his son, they indicate that when the son's face shows up on the CCTV to get lid of the door, as soon as the patient sees him, he jumps up and goes to the door to greet him, knowing in fact that that person is indeed here to see the patient.            Past Medical History:   Diagnosis Date    Anxiety disorder 7/7/2015    Cancer ([address]     skin    Chest pain     Depression     Diabetes (HCC)     borderline    Elevated LFTs     Transient Elevated Lft's    Hypercholesterolemia     Hyperglycemia     Hyperlipidemia 7/7/2015    Hypertension     Ischemic     Ischemic ulceration    Neuropathy, lower extremity     Ulcer     Bleeding ulcers       Past Surgical History:   Procedure Laterality Date    HX COLONOSCOPY      HX GI      bleeding ulcer    HX HERNIA REPAIR      double    HX ORTHOPAEDIC      wart removed    VASCULAR SURGERY PROCEDURE UNLIST Left 4/10/15    LE arteriogram    VASCULAR SURGERY PROCEDURE UNLIST Left 5-26-15    arteriogram         Family History:   Problem Relation Age of Onset    Lung Disease Mother     Anxiety Father     Heart Disease Father         CHF       Social History     Socioeconomic History    Marital status:      Spouse name: Not on file    Number of children: Not on file    Years of education: Not on file    Highest education level: Not on file   Occupational History    Not on file   Tobacco Use    Smoking status: Never Smoker    Smokeless tobacco: Never Used   Substance and Sexual Activity    Alcohol use: No    Drug use: Not on file    Sexual activity: Not on file   Other Topics Concern    Not on file   Social History Narrative    Not on file     Social Determinants of Health     Financial Resource Strain:     Difficulty of Paying Living Expenses: Not on file   Food Insecurity:     Worried About Running Out of Food in the Last Year: Not on file    Ran Out of Food in the Last Year: Not on file   Transportation Needs:     Lack of Transportation (Medical): Not on file    Lack of Transportation (Non-Medical): Not on file   Physical Activity:     Days of Exercise per Week: Not on file    Minutes of Exercise per Session: Not on file   Stress:     Feeling of Stress : Not on file   Social Connections:     Frequency of Communication with Friends and Family: Not on file    Frequency of Social Gatherings with Friends and Family: Not on file    Attends [employer] Services: Not on file    Active Member of [address] [place] [address] [employer] or Organizations: Not on file    Attends Club or Organization Meetings: Not on file    Marital Status: Not on file   Intimate Partner Violence:     Fear of Current or Ex-Partner: Not on file    Emotionally Abused: Not on file    Physically Abused: Not on file    Sexually Abused: Not on file   Housing Stability:     Unable to Pay for Housing in the Last Year: Not on file    Number of [place] in the Last Year: Not on file    Unstable Housing in the Last Year: Not on file         ALLERGIES: Olive extract, Cough drops [menthol], Daypro [oxaprozin], and Tigan [trimethobenzamide]    Review of Systems   Unable to perform ROS: Dementia   Constitutional: Positive for activity change and appetite change. Genitourinary: Positive for difficulty urinating. Psychiatric/Behavioral: Positive for confusion and decreased concentration. All other systems reviewed and are negative. Vitals:    02/25/22 1354   BP: (!) 153/87   Pulse: 81   Resp: 16   Temp: 97.7 °F (36.5 °C)   SpO2: 98%   Weight: 72.6 kg (160 lb)            Physical Exam  Vitals and nursing note reviewed. Constitutional:       General: He is not in acute distress. Appearance: Normal appearance. He is well-developed. He is not ill-appearing, toxic-appearing or diaphoretic. HENT:      Head: Normocephalic and atraumatic.       Right Ear: External ear normal. Left Ear: External ear normal.   Eyes:      General: No scleral icterus. Right eye: No discharge. Left eye: No discharge. Extraocular Movements: Extraocular movements intact. Conjunctiva/sclera: Conjunctivae normal.   Cardiovascular:      Rate and Rhythm: Normal rate and regular rhythm. Pulmonary:      Effort: Pulmonary effort is normal. No respiratory distress. Breath sounds: Normal breath sounds. Abdominal:      General: Abdomen is flat. Tenderness: There is no abdominal tenderness. There is no guarding. Musculoskeletal:         General: Normal range of motion. Cervical back: Normal range of motion and neck supple. Skin:     General: Skin is warm and dry. Findings: No rash. Neurological:      General: No focal deficit present. Mental Status: He is alert. Mental status is at baseline. He is disoriented. Motor: No abnormal muscle tone. Gait: Gait normal.      Comments: cni 2-12 grossly  Nl gait,  Nl speech     Psychiatric:         Mood and Affect: Mood normal.         Behavior: Behavior normal.          MDM  Number of Diagnoses or Management Options  Dementia without behavioral disturbance, unspecified dementia type ([facility] [address]): established and improving  Urinary retention: established and improving  Diagnosis management comments: Medical decision making note:  Dementia patient with agitation last night due to 1200 cc of urinary retention, catheter placed yesterday but does not + at his assisted living memory care unit. Labs look good today as does his CAT scan of his head as they did yesterday, patient up and ambulatory to a satisfactory degree, normally walks without a cane or any assistance. Will start Flomax, remove catheter, discharged back to his memory care unit. This concludes the \"medical decision making note\" part of this emergency department visit note.          Amount and/or Complexity of Data Reviewed  Clinical lab tests: reviewed and ordered (Results Include:    Recent Results (from the past 24 hour(s))  -CBC WITH AUTOMATED DIFF:   Collection Time: 02/24/22  7:58 PM       Result                      Value             Ref Range           WBC                         4.7               4.3 - 11.1 K*       RBC                         3.86 (L)          4.23 - 5.6 M*       HGB                         11.0 (L)          13.6 - 17.2 *       HCT                         34.6 (L)          41.1 - 50.3 %       MCV                         89.6              79.6 - 97.8 *       MCH                         28.5              26.1 - 32.9 *       MCHC                        31.8              31.4 - 35.0 *       RDW                         14.3              11.9 - 14.6 %       PLATELET                    237               150 - 450 K/*       MPV                         9.3 (L)           9.4 - 12.3 FL       ABSOLUTE NRBC               0.00              0.0 - 0.2 K/*       DF                          AUTOMATED                             NEUTROPHILS                 47                43 - 78 %           LYMPHOCYTES                 28                13 - 44 %           MONOCYTES                   17 (H)            4.0 - 12.0 %        EOSINOPHILS                 5                 0.5 - 7.8 %         BASOPHILS                   2                 0.0 - 2.0 %         IMMATURE GRANULOCYTES       0                 0.0 - 5.0 %         ABS. NEUTROPHILS            2.2               1.7 - 8.2 K/*       ABS. LYMPHOCYTES            1.3               0.5 - 4.6 K/*       ABS. MONOCYTES              0.8               0.1 - 1.3 K/*       ABS. EOSINOPHILS            0.2               0.0 - 0.8 K/*       ABS. BASOPHILS              0.1               0.0 - 0.2 K/*       ABS. IMM.  GRANS.            0.0               0.0 - 0.5 K/*  -METABOLIC PANEL, COMPREHENSIVE:   Collection Time: 02/24/22  7:58 PM       Result                      Value             Ref Range           Sodium 140               136 - 145 mm*       Potassium                   3.6               3.5 - 5.1 mm*       Chloride                    105               98 - 107 mmo*       CO2                         27                21 - 32 mmol*       Anion gap                   8                 7 - 16 mmol/L       Glucose                     117 (H)           65 - 100 mg/*       BUN                         18                8 - 23 MG/DL        Creatinine                  0.95              0.8 - 1.5 MG*       GFR est AA                  >60               >60 ml/min/1*       GFR est non-AA              >60               >60 ml/min/1*       Calcium                     8.6               8.3 - 10.4 M*       Bilirubin, total            0.4               0.2 - 1.1 MG*       ALT (SGPT)                  18                12 - 65 U/L         AST (SGOT)                  18                15 - 37 U/L         Alk.  phosphatase            88                50 - 136 U/L        Protein, total              7.2               6.3 - 8.2 g/*       Albumin                     3.4               3.2 - 4.6 g/*       Globulin                    3.8 (H)           2.3 - 3.5 g/*       A-G Ratio                   0.9 (L)           1.2 - 3.5      -URINE MICROSCOPIC:   Collection Time: 02/24/22  8:55 PM       Result                      Value             Ref Range           WBC                         0                 0 /hpf              RBC                         0-3               0 /hpf              Epithelial cells            0                 0 /hpf              Bacteria                    0                 0 /hpf              Casts                       0                 0 /lpf         -CBC WITH AUTOMATED DIFF:   Collection Time: 02/25/22  2:48 PM       Result                      Value             Ref Range           WBC                         6.8               4.3 - 11.1 K*       RBC                         3.94 (L)          4.23 - 5.6 M* HGB                         11.1 (L)          13.6 - 17.2 *       HCT                         35.6 (L)          41.1 - 50.3 %       MCV                         90.4              79.6 - 97.8 *       MCH                         28.2              26.1 - 32.9 *       MCHC                        31.2 (L)          31.4 - 35.0 *       RDW                         14.2              11.9 - 14.6 %       PLATELET                    248               150 - 450 K/*       MPV                         9.3 (L)           9.4 - 12.3 FL       ABSOLUTE NRBC               0.00              0.0 - 0.2 K/*       DF                          AUTOMATED                             NEUTROPHILS                 55                43 - 78 %           LYMPHOCYTES                 18                13 - 44 %           MONOCYTES                   24 (H)            4.0 - 12.0 %        EOSINOPHILS                 2                 0.5 - 7.8 %         BASOPHILS                   1                 0.0 - 2.0 %         IMMATURE GRANULOCYTES       0                 0.0 - 5.0 %         ABS. NEUTROPHILS            3.8               1.7 - 8.2 K/*       ABS. LYMPHOCYTES            1.2               0.5 - 4.6 K/*       ABS. MONOCYTES              1.6 (H)           0.1 - 1.3 K/*       ABS. EOSINOPHILS            0.1               0.0 - 0.8 K/*       ABS. BASOPHILS              0.1               0.0 - 0.2 K/*       ABS. IMM.  GRANS.            0.0               0.0 - 0.5 K/*  -METABOLIC PANEL, COMPREHENSIVE:   Collection Time: 02/25/22  2:48 PM       Result                      Value             Ref Range           Sodium                      141               138 - 145 mm*       Potassium                   4.2               3.5 - 5.1 mm*       Chloride                    108 (H)           98 - 107 mmo*       CO2                         29                21 - 32 mmol*       Anion gap                   4 (L)             7 - 16 mmol/L       Glucose 104 (H)           65 - 100 mg/*       BUN                         14                8 - 23 MG/DL        Creatinine                  0.70 (L)          0.8 - 1.5 MG*       GFR est AA                  >60               >60 ml/min/1*       GFR est non-AA              >60               >60 ml/min/1*       Calcium                     8.9               8.3 - 10.4 M*       Bilirubin, total            0.6               0.2 - 1.1 MG*       ALT (SGPT)                  19                12 - 65 U/L         AST (SGOT)                  22                15 - 37 U/L         Alk.  phosphatase            90                50 - 136 U/L        Protein, total              7.0               6.3 - 8.2 g/*       Albumin                     3.3               3.2 - 4.6 g/*       Globulin                    3.7 (H)           2.3 - 3.5 g/*       A-G Ratio                   0.9 (L)           1.2 - 3.5      -LACTIC ACID:   Collection Time: 02/25/22  2:48 PM       Result                      Value             Ref Range           Lactic acid                 1.0               0.4 - 2.0 MM*  -MAGNESIUM:   Collection Time: 02/25/22  2:48 PM       Result                      Value             Ref Range           Magnesium                   2.3               1.8 - 2.4 mg*  )  Tests in the radiology section of CPT®: ordered and reviewed  Decide to obtain previous medical records or to obtain history from someone other than the patient: yes  Obtain history from someone other than the patient: yes (Son at bedside, and  at the memory care unit)    Risk of Complications, Morbidity, and/or Mortality  Presenting problems: moderate  Diagnostic procedures: low  Management options: low    Patient Progress  Patient progress: improved         Procedures

## 2022-02-25 NOTE — ED NOTES
I have reviewed discharge instructions with the patient, son and staff at [facility]. The patient, son and staff at [facility]. and caregiver verbalized understanding. Patient left ED via Discharge Method: stretcher to SNF with [facility] for questions and clarification provided. Patient given 1 scripts. To continue your aftercare when you leave the hospital, you may receive an automated call from our care team to check in on how you are doing. This is a free service and part of our promise to provide the best care and service to meet your aftercare needs.  If you have questions, or wish to unsubscribe from this service please call [phone]. Thank you for Choosing our [facility] Emergency Department.

## 2022-02-25 NOTE — ED TRIAGE NOTES
Patient arrived by [facility] from [facility] with complaints of increased confusion. Patient was seen at [facility] yesterday for urinary retention and butterfield was placed. Urine was negative for UTI yesterday.     140/70 99% RA

## 2022-02-25 NOTE — DISCHARGE INSTRUCTIONS
Leave catheter in place until follow up with urology.  Return for worsened symptoms, concerns or questions

## 2022-12-04 ENCOUNTER — APPOINTMENT (OUTPATIENT)
Dept: GENERAL RADIOLOGY | Age: 87
DRG: 871 | End: 2022-12-04
Payer: MEDICARE

## 2022-12-04 ENCOUNTER — HOSPITAL ENCOUNTER (INPATIENT)
Age: 87
LOS: 1 days | Discharge: ANOTHER HOSPITAL | DRG: 871 | End: 2022-12-05
Attending: EMERGENCY MEDICINE | Admitting: FAMILY MEDICINE
Payer: MEDICARE

## 2022-12-04 DIAGNOSIS — A41.9 SEPSIS, DUE TO UNSPECIFIED ORGANISM, UNSPECIFIED WHETHER ACUTE ORGAN DYSFUNCTION PRESENT (HCC): Primary | ICD-10-CM

## 2022-12-04 DIAGNOSIS — F03.911 DEMENTIA WITH AGITATION, UNSPECIFIED DEMENTIA SEVERITY, UNSPECIFIED DEMENTIA TYPE: ICD-10-CM

## 2022-12-04 DIAGNOSIS — R41.82 ALTERED MENTAL STATUS, UNSPECIFIED ALTERED MENTAL STATUS TYPE: ICD-10-CM

## 2022-12-04 PROBLEM — Z79.01 CURRENT USE OF LONG TERM ANTICOAGULATION: Status: ACTIVE | Noted: 2022-12-04

## 2022-12-04 PROBLEM — F01.50 VASCULAR DEMENTIA WITH DELIRIUM (HCC): Status: ACTIVE | Noted: 2022-12-04

## 2022-12-04 PROBLEM — I48.91 A-FIB (HCC): Chronic | Status: ACTIVE | Noted: 2022-01-01

## 2022-12-04 PROBLEM — F05 VASCULAR DEMENTIA WITH DELIRIUM (HCC): Status: ACTIVE | Noted: 2022-01-01

## 2022-12-04 LAB
ALBUMIN SERPL-MCNC: 3.7 G/DL (ref 3.2–4.6)
ALBUMIN/GLOB SERPL: 0.9 {RATIO} (ref 0.4–1.6)
ALP SERPL-CCNC: 98 U/L (ref 50–136)
ALT SERPL-CCNC: 19 U/L (ref 12–65)
ANION GAP SERPL CALC-SCNC: 5 MMOL/L (ref 2–11)
AST SERPL-CCNC: 22 U/L (ref 15–37)
BASOPHILS # BLD: 0.1 K/UL (ref 0–0.2)
BASOPHILS NFR BLD: 1 % (ref 0–2)
BILIRUB SERPL-MCNC: 0.7 MG/DL (ref 0.2–1.1)
BILIRUB UR QL: ABNORMAL
BUN SERPL-MCNC: 27 MG/DL (ref 8–23)
CALCIUM SERPL-MCNC: 8.9 MG/DL (ref 8.3–10.4)
CHLORIDE SERPL-SCNC: 111 MMOL/L (ref 101–110)
CO2 SERPL-SCNC: 26 MMOL/L (ref 21–32)
CREAT SERPL-MCNC: 1.1 MG/DL (ref 0.8–1.5)
DIFFERENTIAL METHOD BLD: ABNORMAL
EKG ATRIAL RATE: 109 BPM
EKG DIAGNOSIS: NORMAL
EKG Q-T INTERVAL: 352 MS
EKG QRS DURATION: 103 MS
EKG QTC CALCULATION (BAZETT): 452 MS
EKG R AXIS: 100 DEGREES
EKG T AXIS: -79 DEGREES
EKG VENTRICULAR RATE: 99 BPM
EOSINOPHIL # BLD: 0 K/UL (ref 0–0.8)
EOSINOPHIL NFR BLD: 0 % (ref 0.5–7.8)
ERYTHROCYTE [DISTWIDTH] IN BLOOD BY AUTOMATED COUNT: 13.5 % (ref 11.9–14.6)
FLUAV AG NPH QL IA: NEGATIVE
FLUBV AG NPH QL IA: NEGATIVE
GLOBULIN SER CALC-MCNC: 3.9 G/DL (ref 2.8–4.5)
GLUCOSE SERPL-MCNC: 147 MG/DL (ref 65–100)
GLUCOSE UR QL STRIP.AUTO: NEGATIVE MG/DL
HCT VFR BLD AUTO: 37.8 % (ref 41.1–50.3)
HGB BLD-MCNC: 12 G/DL (ref 13.6–17.2)
IMM GRANULOCYTES # BLD AUTO: 0.1 K/UL (ref 0–0.5)
IMM GRANULOCYTES NFR BLD AUTO: 1 % (ref 0–5)
KETONES UR-MCNC: NEGATIVE MG/DL
LACTATE SERPL-SCNC: 1.4 MMOL/L (ref 0.4–2)
LACTATE SERPL-SCNC: 2.2 MMOL/L (ref 0.4–2)
LEUKOCYTE ESTERASE UR QL STRIP: NEGATIVE
LYMPHOCYTES # BLD: 0.5 K/UL (ref 0.5–4.6)
LYMPHOCYTES NFR BLD: 3 % (ref 13–44)
MCH RBC QN AUTO: 29.1 PG (ref 26.1–32.9)
MCHC RBC AUTO-ENTMCNC: 31.7 G/DL (ref 31.4–35)
MCV RBC AUTO: 91.7 FL (ref 82–102)
MONOCYTES # BLD: 0.9 K/UL (ref 0.1–1.3)
MONOCYTES NFR BLD: 7 % (ref 4–12)
NEUTS SEG # BLD: 11.8 K/UL (ref 1.7–8.2)
NEUTS SEG NFR BLD: 88 % (ref 43–78)
NITRITE UR QL: NEGATIVE
NRBC # BLD: 0 K/UL (ref 0–0.2)
PH UR: 5.5 [PH] (ref 5–9)
PLATELET # BLD AUTO: 259 K/UL (ref 150–450)
PMV BLD AUTO: 10.4 FL (ref 9.4–12.3)
POTASSIUM SERPL-SCNC: 4.6 MMOL/L (ref 3.5–5.1)
PROCALCITONIN SERPL-MCNC: 0.06 NG/ML (ref 0–0.49)
PROT SERPL-MCNC: 7.6 G/DL (ref 6.3–8.2)
PROT UR QL: ABNORMAL MG/DL
RBC # BLD AUTO: 4.12 M/UL (ref 4.23–5.6)
RBC # UR STRIP: NEGATIVE /UL
SARS-COV-2 RDRP RESP QL NAA+PROBE: NOT DETECTED
SERVICE CMNT-IMP: ABNORMAL
SODIUM SERPL-SCNC: 142 MMOL/L (ref 133–143)
SOURCE: NORMAL
SP GR UR: >1.03 (ref 1–1.02)
SPECIMEN SOURCE: NORMAL
UROBILINOGEN UR QL: 1 EU/DL (ref 0.2–1)
WBC # BLD AUTO: 13.3 K/UL (ref 4.3–11.1)

## 2022-12-04 PROCEDURE — 80053 COMPREHEN METABOLIC PANEL: CPT

## 2022-12-04 PROCEDURE — 87635 SARS-COV-2 COVID-19 AMP PRB: CPT

## 2022-12-04 PROCEDURE — 87804 INFLUENZA ASSAY W/OPTIC: CPT

## 2022-12-04 PROCEDURE — 96365 THER/PROPH/DIAG IV INF INIT: CPT | Performed by: EMERGENCY MEDICINE

## 2022-12-04 PROCEDURE — 6360000002 HC RX W HCPCS: Performed by: EMERGENCY MEDICINE

## 2022-12-04 PROCEDURE — 81003 URINALYSIS AUTO W/O SCOPE: CPT

## 2022-12-04 PROCEDURE — 6370000000 HC RX 637 (ALT 250 FOR IP): Performed by: EMERGENCY MEDICINE

## 2022-12-04 PROCEDURE — 87040 BLOOD CULTURE FOR BACTERIA: CPT

## 2022-12-04 PROCEDURE — 83605 ASSAY OF LACTIC ACID: CPT

## 2022-12-04 PROCEDURE — 87205 SMEAR GRAM STAIN: CPT

## 2022-12-04 PROCEDURE — 84145 PROCALCITONIN (PCT): CPT

## 2022-12-04 PROCEDURE — 87086 URINE CULTURE/COLONY COUNT: CPT

## 2022-12-04 PROCEDURE — 85025 COMPLETE CBC W/AUTO DIFF WBC: CPT

## 2022-12-04 PROCEDURE — 93005 ELECTROCARDIOGRAM TRACING: CPT | Performed by: EMERGENCY MEDICINE

## 2022-12-04 PROCEDURE — 71045 X-RAY EXAM CHEST 1 VIEW: CPT

## 2022-12-04 PROCEDURE — 0202U NFCT DS 22 TRGT SARS-COV-2: CPT

## 2022-12-04 PROCEDURE — 96372 THER/PROPH/DIAG INJ SC/IM: CPT | Performed by: EMERGENCY MEDICINE

## 2022-12-04 PROCEDURE — 99285 EMERGENCY DEPT VISIT HI MDM: CPT | Performed by: EMERGENCY MEDICINE

## 2022-12-04 PROCEDURE — 2580000003 HC RX 258: Performed by: EMERGENCY MEDICINE

## 2022-12-04 PROCEDURE — 96361 HYDRATE IV INFUSION ADD-ON: CPT | Performed by: EMERGENCY MEDICINE

## 2022-12-04 PROCEDURE — 87150 DNA/RNA AMPLIFIED PROBE: CPT

## 2022-12-04 PROCEDURE — 2580000003 HC RX 258: Performed by: FAMILY MEDICINE

## 2022-12-04 PROCEDURE — 2500000003 HC RX 250 WO HCPCS: Performed by: FAMILY MEDICINE

## 2022-12-04 RX ORDER — ACETAMINOPHEN 650 MG/1
650 SUPPOSITORY RECTAL
Status: COMPLETED | OUTPATIENT
Start: 2022-12-04 | End: 2022-12-04

## 2022-12-04 RX ORDER — 0.9 % SODIUM CHLORIDE 0.9 %
1000 INTRAVENOUS SOLUTION INTRAVENOUS ONCE
Status: COMPLETED | OUTPATIENT
Start: 2022-12-04 | End: 2022-12-05

## 2022-12-04 RX ORDER — 0.9 % SODIUM CHLORIDE 0.9 %
1000 INTRAVENOUS SOLUTION INTRAVENOUS ONCE
Status: COMPLETED | OUTPATIENT
Start: 2022-12-04 | End: 2022-12-04

## 2022-12-04 RX ADMIN — WATER 5 MG: 1 INJECTION INTRAMUSCULAR; INTRAVENOUS; SUBCUTANEOUS at 23:35

## 2022-12-04 RX ADMIN — SODIUM CHLORIDE 1000 ML: 9 INJECTION, SOLUTION INTRAVENOUS at 23:13

## 2022-12-04 RX ADMIN — SODIUM CHLORIDE 1000 ML: 9 INJECTION, SOLUTION INTRAVENOUS at 19:51

## 2022-12-04 RX ADMIN — CEFTRIAXONE 1000 MG: 1 INJECTION, POWDER, FOR SOLUTION INTRAMUSCULAR; INTRAVENOUS at 19:48

## 2022-12-04 RX ADMIN — ACETAMINOPHEN 650 MG: 650 SUPPOSITORY RECTAL at 18:52

## 2022-12-04 NOTE — ED TRIAGE NOTES
Pt arrives via [facility] coming from [facility] c/o and was sent to this facility. Pt had 101.7 fever and HR . Pt has hx of afib. No interventions en route by EMS. Pt normally A&Ox2. Pt has worsening mentation.

## 2022-12-05 ENCOUNTER — HOSPITAL ENCOUNTER (INPATIENT)
Age: 87
LOS: 4 days | Discharge: HOME | DRG: 871 | End: 2022-12-09
Attending: FAMILY MEDICINE | Admitting: FAMILY MEDICINE
Payer: MEDICARE

## 2022-12-05 VITALS
DIASTOLIC BLOOD PRESSURE: 107 MMHG | TEMPERATURE: 99.7 F | RESPIRATION RATE: 22 BRPM | SYSTOLIC BLOOD PRESSURE: 123 MMHG | HEART RATE: 80 BPM | OXYGEN SATURATION: 94 %

## 2022-12-05 DIAGNOSIS — R78.81 BACTEREMIA: Primary | ICD-10-CM

## 2022-12-05 PROBLEM — G93.40 ACUTE ENCEPHALOPATHY: Status: ACTIVE | Noted: 2022-12-05

## 2022-12-05 LAB

## 2022-12-05 PROCEDURE — 6360000002 HC RX W HCPCS: Performed by: NURSE PRACTITIONER

## 2022-12-05 PROCEDURE — 2580000003 HC RX 258: Performed by: NURSE PRACTITIONER

## 2022-12-05 PROCEDURE — 2500000003 HC RX 250 WO HCPCS: Performed by: FAMILY MEDICINE

## 2022-12-05 PROCEDURE — 96374 THER/PROPH/DIAG INJ IV PUSH: CPT | Performed by: EMERGENCY MEDICINE

## 2022-12-05 PROCEDURE — 2580000003 HC RX 258: Performed by: FAMILY MEDICINE

## 2022-12-05 PROCEDURE — 1100000003 HC PRIVATE W/ TELEMETRY

## 2022-12-05 PROCEDURE — 96361 HYDRATE IV INFUSION ADD-ON: CPT | Performed by: EMERGENCY MEDICINE

## 2022-12-05 PROCEDURE — 1100000000 HC RM PRIVATE

## 2022-12-05 RX ORDER — SODIUM CHLORIDE 0.9 % (FLUSH) 0.9 %
5-40 SYRINGE (ML) INJECTION EVERY 12 HOURS SCHEDULED
Status: DISCONTINUED | OUTPATIENT
Start: 2022-12-05 | End: 2022-12-05 | Stop reason: HOSPADM

## 2022-12-05 RX ORDER — METOPROLOL SUCCINATE 25 MG/1
25 TABLET, EXTENDED RELEASE ORAL DAILY
Status: DISCONTINUED | OUTPATIENT
Start: 2022-12-06 | End: 2022-12-09 | Stop reason: HOSPADM

## 2022-12-05 RX ORDER — SODIUM CHLORIDE 0.9 % (FLUSH) 0.9 %
5-40 SYRINGE (ML) INJECTION PRN
Status: DISCONTINUED | OUTPATIENT
Start: 2022-12-05 | End: 2022-12-05 | Stop reason: HOSPADM

## 2022-12-05 RX ORDER — ACETAMINOPHEN 650 MG/1
650 SUPPOSITORY RECTAL EVERY 6 HOURS PRN
Status: DISCONTINUED | OUTPATIENT
Start: 2022-12-05 | End: 2022-12-05 | Stop reason: HOSPADM

## 2022-12-05 RX ORDER — ACETAMINOPHEN 325 MG/1
650 TABLET ORAL EVERY 6 HOURS PRN
Status: DISCONTINUED | OUTPATIENT
Start: 2022-12-05 | End: 2022-12-09 | Stop reason: HOSPADM

## 2022-12-05 RX ORDER — SODIUM CHLORIDE 0.9 % (FLUSH) 0.9 %
5-40 SYRINGE (ML) INJECTION PRN
Status: DISCONTINUED | OUTPATIENT
Start: 2022-12-05 | End: 2022-12-09 | Stop reason: HOSPADM

## 2022-12-05 RX ORDER — SODIUM CHLORIDE 0.9 % (FLUSH) 0.9 %
5-40 SYRINGE (ML) INJECTION EVERY 12 HOURS SCHEDULED
Status: DISCONTINUED | OUTPATIENT
Start: 2022-12-05 | End: 2022-12-09 | Stop reason: HOSPADM

## 2022-12-05 RX ORDER — LANOLIN ALCOHOL/MO/W.PET/CERES
3 CREAM (GRAM) TOPICAL NIGHTLY PRN
Status: DISCONTINUED | OUTPATIENT
Start: 2022-12-05 | End: 2022-12-09 | Stop reason: HOSPADM

## 2022-12-05 RX ORDER — 0.9 % SODIUM CHLORIDE 0.9 %
1000 INTRAVENOUS SOLUTION INTRAVENOUS
Status: COMPLETED | OUTPATIENT
Start: 2022-12-05 | End: 2022-12-05

## 2022-12-05 RX ORDER — ACETAMINOPHEN 650 MG/1
650 SUPPOSITORY RECTAL EVERY 6 HOURS PRN
Status: DISCONTINUED | OUTPATIENT
Start: 2022-12-05 | End: 2022-12-09 | Stop reason: HOSPADM

## 2022-12-05 RX ORDER — QUETIAPINE FUMARATE 25 MG/1
50 TABLET, FILM COATED ORAL DAILY
Status: DISCONTINUED | OUTPATIENT
Start: 2022-12-06 | End: 2022-12-09 | Stop reason: HOSPADM

## 2022-12-05 RX ORDER — POLYETHYLENE GLYCOL 3350 17 G/17G
17 POWDER, FOR SOLUTION ORAL DAILY PRN
Status: DISCONTINUED | OUTPATIENT
Start: 2022-12-05 | End: 2022-12-09 | Stop reason: HOSPADM

## 2022-12-05 RX ORDER — ACETAMINOPHEN 325 MG/1
650 TABLET ORAL EVERY 6 HOURS PRN
Status: DISCONTINUED | OUTPATIENT
Start: 2022-12-05 | End: 2022-12-05 | Stop reason: HOSPADM

## 2022-12-05 RX ORDER — ONDANSETRON 4 MG/1
4 TABLET, ORALLY DISINTEGRATING ORAL EVERY 8 HOURS PRN
Status: DISCONTINUED | OUTPATIENT
Start: 2022-12-05 | End: 2022-12-09 | Stop reason: HOSPADM

## 2022-12-05 RX ORDER — SODIUM CHLORIDE 9 MG/ML
INJECTION, SOLUTION INTRAVENOUS PRN
Status: DISCONTINUED | OUTPATIENT
Start: 2022-12-05 | End: 2022-12-05 | Stop reason: HOSPADM

## 2022-12-05 RX ORDER — SODIUM CHLORIDE 9 MG/ML
INJECTION, SOLUTION INTRAVENOUS PRN
Status: DISCONTINUED | OUTPATIENT
Start: 2022-12-05 | End: 2022-12-09 | Stop reason: HOSPADM

## 2022-12-05 RX ORDER — ONDANSETRON 2 MG/ML
4 INJECTION INTRAMUSCULAR; INTRAVENOUS EVERY 6 HOURS PRN
Status: DISCONTINUED | OUTPATIENT
Start: 2022-12-05 | End: 2022-12-09 | Stop reason: HOSPADM

## 2022-12-05 RX ORDER — METOPROLOL TARTRATE 5 MG/5ML
5 INJECTION INTRAVENOUS ONCE
Status: COMPLETED | OUTPATIENT
Start: 2022-12-05 | End: 2022-12-05

## 2022-12-05 RX ORDER — SODIUM CHLORIDE, SODIUM LACTATE, POTASSIUM CHLORIDE, CALCIUM CHLORIDE 600; 310; 30; 20 MG/100ML; MG/100ML; MG/100ML; MG/100ML
INJECTION, SOLUTION INTRAVENOUS CONTINUOUS
Status: DISCONTINUED | OUTPATIENT
Start: 2022-12-05 | End: 2022-12-05 | Stop reason: HOSPADM

## 2022-12-05 RX ORDER — SODIUM CHLORIDE 9 MG/ML
INJECTION, SOLUTION INTRAVENOUS CONTINUOUS
Status: DISCONTINUED | OUTPATIENT
Start: 2022-12-05 | End: 2022-12-09 | Stop reason: HOSPADM

## 2022-12-05 RX ADMIN — SODIUM CHLORIDE 1000 MG: 900 INJECTION INTRAVENOUS at 17:26

## 2022-12-05 RX ADMIN — SODIUM CHLORIDE 1000 ML: 9 INJECTION, SOLUTION INTRAVENOUS at 00:24

## 2022-12-05 RX ADMIN — SODIUM CHLORIDE, PRESERVATIVE FREE 10 ML: 5 INJECTION INTRAVENOUS at 20:41

## 2022-12-05 RX ADMIN — METOPROLOL TARTRATE 5 MG: 5 INJECTION INTRAVENOUS at 00:27

## 2022-12-05 NOTE — ED PROVIDER NOTES
Emergency Department Provider Note                   PCP:                Joel Thompson MD               Age: 80 y.o. Sex: male       ICD-10-CM    1. Sepsis, due to unspecified organism, unspecified whether acute organ dysfunction present ([facility]  A41.9       2. Altered mental status, unspecified altered mental status type  R41.82       3. Dementia with agitation, unspecified dementia severity, unspecified dementia type  F03.911           DISPOSITION Decision To Admit 12/04/2022 08:31:38 PM       MDM  Number of Diagnoses or Management Options  Altered mental status, unspecified altered mental status type  Dementia with agitation, unspecified dementia severity, unspecified dementia type  Sepsis, due to unspecified organism, unspecified whether acute organ dysfunction present [facility])  Diagnosis management comments: Patient is more altered than normal with fever and tachycardia history of A. fib. He was given rectal Tylenol and fluids. Will likely need admission. His blood pressure is stable. He was given ceftriaxone initially given his fever. D/w family. Admitted to hospitalist.  Fever improved.          Amount and/or Complexity of Data Reviewed  Clinical lab tests: ordered and reviewed  Tests in the radiology section of CPT®: ordered and reviewed  Review and summarize past medical records: yes  Discuss the patient with other providers: yes  Independent visualization of images, tracings, or specimens: yes    Risk of Complications, Morbidity, and/or Mortality  Presenting problems: high  Diagnostic procedures: minimal  Management options: high    Patient Progress  Patient progress: improved             Orders Placed This Encounter   Procedures    Culture, Blood 1    Culture, Blood 2    Rapid influenza A/B antigens    COVID-19, Rapid    Culture, Urine    XR CHEST PORTABLE    Lactate, Sepsis    CBC with Auto Differential    CMP    Procalcitonin    Cardiac Monitor - ED Only    Straight Cath (Select if patient is unable to provide a sample)    POCT Urine Dipstick    POCT Urinalysis no Micro    EKG 12 Lead    Saline lock IV        Medications   acetaminophen (TYLENOL) suppository 650 mg (650 mg Rectal Given 12/4/22 1852)   cefTRIAXone (ROCEPHIN) 1,000 mg in sodium chloride 0.9 % 50 mL IVPB mini-bag (0 mg IntraVENous Stopped 12/4/22 2024)   0.9 % sodium chloride bolus (0 mLs IntraVENous Stopped 12/4/22 2048)       New Prescriptions    No medications on file        Denny Pitt is a 80 y.o. male who presents to the Emergency Department with chief complaint of    Chief Complaint   Patient presents with    Fever    Emesis      Patient is nonverbal with me. Nurses report he does speak and has been slightly combative. He arrives from a memory care unit with a fever and decreased mental status. Patient has a history of dementia and is typically A and O x2. The history is provided by the EMS personnel. The history is limited by the condition of the patient. All other systems reviewed and are negative unless otherwise stated in the history of present illness section.     Review of Systems   Unable to perform ROS: Dementia     Past Medical History:   Diagnosis Date    Anxiety disorder 7/7/2015    Cancer ([facility]     skin    Chest pain     Depression     Diabetes ([facility]     borderline    Elevated LFTs     Transient Elevated Lft's    Hypercholesterolemia     Hyperglycemia     Hyperlipidemia 7/7/2015    Hypertension     Ischemic     Ischemic ulceration    Neuropathy, lower extremity     Ulcer     Bleeding ulcers        Past Surgical History:   Procedure Laterality Date    COLONOSCOPY      GI      bleeding ulcer    HERNIA REPAIR      double    ORTHOPEDIC SURGERY      wart removed    VASCULAR SURGERY Left 5-26-15    arteriogram    VASCULAR SURGERY Left 4/10/15    LE arteriogram        Family History   Problem Relation Age of Onset    Heart Disease Father         CHF    Lung Disease Mother     Anxiety Disorder Father         Social History     Socioeconomic History    Marital status:    Tobacco Use    Smoking status: Never    Smokeless tobacco: Never   Substance and Sexual Activity    Alcohol use: No        Allergies: Menthol, Oxaprozin, and Trimethobenzamide    Previous Medications    APIXABAN (ELIQUIS) 5 MG TABS TABLET    TAKE 1 TABLET BY MOUTH TWICE DAILY    METOPROLOL SUCCINATE (TOPROL XL) 25 MG EXTENDED RELEASE TABLET    TAKE 1 TABLET BY MOUTH ONCE DAILY *DO NOT CRUSH OR CHEW*        Vitals signs and nursing note reviewed. Patient Vitals for the past 4 hrs:   Temp Pulse Resp BP SpO2   12/04/22 2103 -- 97 28 133/71 --   12/04/22 2046 99.7 °F (37.6 °C) 84 17 -- --   12/04/22 2045 -- -- -- (!) 150/71 --   12/04/22 2033 -- 97 23 (!) 143/74 98 %   12/04/22 1948 -- 96 (!) 33 132/73 94 %   12/04/22 1845 (!) 102.7 °F (39.3 °C) (!) 137 26 (!) 143/129 93 %   12/04/22 1830 -- (!) 114 20 138/62 --          Physical Exam  Vitals and nursing note reviewed. Constitutional:       General: He is not in acute distress. Appearance: Normal appearance. He is well-developed. He is ill-appearing. HENT:      Head: Normocephalic and atraumatic. Mouth/Throat:      Mouth: Mucous membranes are dry. Pharynx: Oropharynx is clear. Eyes:      General: No scleral icterus. Extraocular Movements: Extraocular movements intact. Cardiovascular:      Rate and Rhythm: Tachycardia present. Rhythm irregular. Pulmonary:      Effort: Pulmonary effort is normal. No respiratory distress. Breath sounds: Normal breath sounds. Abdominal:      Palpations: Abdomen is soft. Tenderness: There is no abdominal tenderness. Musculoskeletal:         General: Normal range of motion. Cervical back: Normal range of motion and neck supple. Right lower leg: No edema. Left lower leg: No edema. Skin:     General: Skin is warm and dry. Coloration: Skin is not jaundiced. Neurological:      Mental Status: He is alert.       Cranial Nerves: No cranial nerve deficit. Comments: Nonverbal, moves all extremities   Psychiatric:         Mood and Affect: Mood normal.         Behavior: Behavior normal.        Procedures    ED EKG Interpretation  EKG was interpreted in the absence of a cardiologist.    Rate: 99  EKG Interpretation: EKG Interpretation: atrial fibrillation  ST Segments: Nonspecific ST segments - NO STEMI    Results for orders placed or performed during the hospital encounter of 12/04/22   Rapid influenza A/B antigens    Specimen: Nasal Washing   Result Value Ref Range    Influenza A Ag Negative NEG      Influenza B Ag Negative NEG      Source Nasopharyngeal     COVID-19, Rapid    Specimen: Nasopharyngeal   Result Value Ref Range    Source NASAL      SARS-CoV-2, Rapid Not detected NOTD     XR CHEST PORTABLE    Narrative    Portable chest x-ray    CLINICAL INDICATION: Sepsis    FINDINGS: Single AP view the chest compared to a similar exam dated 2/24/2022  shows no change in the mild reticular nodular interstitial prominence. There is  no confluent airspace opacity. No pleural effusion. The cardiac silhouette and  mediastinum are unremarkable. Impression    Chronic interstitial changes suspected. No acute abnormality.    Lactate, Sepsis   Result Value Ref Range    Lactic Acid, Sepsis 2.2 (H) 0.4 - 2.0 MMOL/L   CBC with Auto Differential   Result Value Ref Range    WBC 13.3 (H) 4.3 - 11.1 K/uL    RBC 4.12 (L) 4.23 - 5.6 M/uL    Hemoglobin 12.0 (L) 13.6 - 17.2 g/dL    Hematocrit 37.8 (L) 41.1 - 50.3 %    MCV 91.7 82 - 102 FL    MCH 29.1 26.1 - 32.9 PG    MCHC 31.7 31.4 - 35.0 g/dL    RDW 13.5 11.9 - 14.6 %    Platelets 618 493 - 679 K/uL    MPV 10.4 9.4 - 12.3 FL    nRBC 0.00 0.0 - 0.2 K/uL    Differential Type AUTOMATED      Seg Neutrophils 88 (H) 43 - 78 %    Lymphocytes 3 (L) 13 - 44 %    Monocytes 7 4.0 - 12.0 %    Eosinophils % 0 (L) 0.5 - 7.8 %    Basophils 1 0.0 - 2.0 %    Immature Granulocytes 1 0.0 - 5.0 %    Segs Absolute 11.8 (H) 1.7 - 8.2 K/UL    Absolute Lymph # 0.5 0.5 - 4.6 K/UL    Absolute Mono # 0.9 0.1 - 1.3 K/UL    Absolute Eos # 0.0 0.0 - 0.8 K/UL    Basophils Absolute 0.1 0.0 - 0.2 K/UL    Absolute Immature Granulocyte 0.1 0.0 - 0.5 K/UL   CMP   Result Value Ref Range    Sodium 142 133 - 143 mmol/L    Potassium 4.6 3.5 - 5.1 mmol/L    Chloride 111 (H) 101 - 110 mmol/L    CO2 26 21 - 32 mmol/L    Anion Gap 5 2 - 11 mmol/L    Glucose 147 (H) 65 - 100 mg/dL    BUN 27 (H) 8 - 23 MG/DL    Creatinine 1.10 0.8 - 1.5 MG/DL    Est, Glom Filt Rate >60 >60 ml/min/1.73m2    Calcium 8.9 8.3 - 10.4 MG/DL    Total Bilirubin 0.7 0.2 - 1.1 MG/DL    ALT 19 12 - 65 U/L    AST 22 15 - 37 U/L    Alk Phosphatase 98 50 - 136 U/L    Total Protein 7.6 6.3 - 8.2 g/dL    Albumin 3.7 3.2 - 4.6 g/dL    Globulin 3.9 2.8 - 4.5 g/dL    Albumin/Globulin Ratio 0.9 0.4 - 1.6     Procalcitonin   Result Value Ref Range    Procalcitonin 0.06 0.00 - 0.49 ng/mL   POCT Urinalysis no Micro   Result Value Ref Range    Specific Gravity, Urine, POC >1.030 (H) 1.001 - 1.023    pH, Urine, POC 5.5 5.0 - 9.0      Protein, Urine, POC TRACE (A) NEG mg/dL    Glucose, UA POC Negative NEG mg/dL    Ketones, Urine, POC Negative NEG mg/dL    Bilirubin, Urine, POC SMALL (A) NEG      Blood, UA POC Negative NEG      URINE UROBILINOGEN POC 1.0 0.2 - 1.0 EU/dL    Nitrate, Urine, POC Negative NEG      Leukocyte Est, UA POC Negative NEG      Performed by: Laurita Beckwith    EKG 12 Lead   Result Value Ref Range    Ventricular Rate 99 BPM    Atrial Rate 109 BPM    QRS Duration 103 ms    Q-T Interval 352 ms    QTc Calculation (Bazett) 452 ms    R Axis 100 degrees    T Axis -79 degrees    Diagnosis Atrial fibrillation         XR CHEST PORTABLE   Final Result   Chronic interstitial changes suspected. No acute abnormality. Voice dictation software was used during the making of this note.   This software is not perfect and grammatical and other typographical errors may be present. This note has not been completely proofread for errors.      Tate Guaman MD  12/04/22 [id number]

## 2022-12-05 NOTE — ACP (ADVANCE CARE PLANNING)
[facility] Hospitalist Service  At the heart of better care     Advance Care Planning   Admit Date:  2022  6:18 PM   Name:  Bartolome Cunningham Sr.   Age:  80 y.o. Sex:  male  :  1932   MRN:  [id number]   [facility]    Bartolome Cunningham Sr. is able to make his own decisions:   No     If pt unable to make decisions, POA/surrogate decision maker:  Son     Other people present:   None     Patient / surrogate decision-maker directed code status:  DNR - no extreme measures. Treat medically but no life support. Desires a natural death. Other ACP topics discussed, if applicable:       Patient or surrogate consented to discussion of the current conditions, workup, management plans, prognosis, and the risk for further deterioration. Time spent: 17 minutes in direct discussion with son over the phone.        Signed:  Carmen Joaquin DO, DO

## 2022-12-05 NOTE — CARE COORDINATION
Pt screened for d/c planning needs. Pt with hx: Dementia and lives in [address] [facility] assisted living. I spoke to son -Lucy Perez who is hoping his father will be able to return to his AL on d/c. I also spoke to [facility] in Marketing and left a message for their nurse Tong Lacy to call us. Nurses station # given. [facility] at [facility] confirmed that his baseline is independent mobility-does not use any assistive device but wanders inside the Memory Care and likes to rearrange furniture. His favorite thing is a lollipop/sucker. Son worried about his father's sore on right foot. Nursing notified. Patient has Dr. Madina Newman listed as PCP but son reports the facility's in-house physician group-[facility] has been seeing him and son prefers to not take him to outside appts. Son will probably prefer stretcher transport on d/c due to confusion. PT /OT ordered per charge nurse> CM to follow and assist with AL return on d/c. May need HHRN. Will follow     12/05/22 2021   Service Assessment   Patient Orientation Unable to Assess  (asleep)   Cognition Dementia / Early Alzheimer's   History Provided By Child/Family   Primary Caregiver Other (Comment)   Accompanied By/Relationship son   PCP Verified by CM Yes   Last Visit to PCP Within last 3 months  (Uses [facility] the Assisted living's inhouse PCP)   Prior Functional Level Independent in ADLs/IADLs   Current Functional Level Independent in ADLs/IADLs   Can patient return to prior living arrangement Yes   Ability to make needs known: Poor   Family able to assist with home care needs: No   Would you like for me to discuss the discharge plan with any other family members/significant others, and if so, who?  Yes   Social/Functional History   Type of Home Assisted living   Discharge Planning   Type of Residence Assisted living   Potential Assistance Needed Home Care   Patient expects to be [facility] Ass[facility]   Mode of Transport at Discharge BLS   Confirm Follow Up Transport Family

## 2022-12-05 NOTE — PROGRESS NOTES
Hospitalist Progress Note   Admit Date:  2022  3:18 AM   Name:  Chuckie Mullen Sr.   Age:  80 y.o. Sex:  male  :  1932   MRN:  [id number]   Room:  [facility]/    Presenting Complaint: No chief complaint on file. 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 male with medical history of vascular dementia, AFIB on apixaban admitted  for acute encephalopathy related to UTI. He met sepsis criteria. Started on Rocephin. . Rapid covid/influenza negative. Subjective & 24hr Events (22): Alert to name, follows commands. Afebrile. Assessment & Plan:     Principal Problem:    Acute encephalopathy  Plan: likely from UTI  Trend urine and BC x2    Sepsis:  Continue with plan for above    A Fib:  BB, Eliquis resumed  HR controlled    Vascular dementia:  Seroquel resumed  Melatonin resumed for sleep hygiene. Essential HTN:  Controlled  BB        Anticipated discharge needs:      TBD    Diet:  ADULT DIET; Easy to Chew  DVT PPx: Eliquis  Code status: DNR    Hospital Problems:  Principal Problem:    Acute encephalopathy  Resolved Problems:    * No resolved hospital problems. *      Objective:   Patient Vitals for the past 24 hrs:   Temp Pulse Resp BP SpO2   22 0811 98.2 °F (36.8 °C) 75 20 93/60 97 %   22 0339 98.9 °F (37.2 °C) 82 20 (!) 112/55 97 %       Oxygen Therapy  SpO2: 97 %    Estimated body mass index is 21.7 kg/m² as calculated from the following:    Height as of 21: 6' (1.829 m). Weight as of 22: 160 lb (72.6 kg). No intake or output data in the 24 hours ending 22 1334      Physical Exam:   Blood pressure 93/60, pulse 75, temperature 98.2 °F (36.8 °C), temperature source Axillary, resp. rate 20, SpO2 97 %. General:          Elderly male. Marv MORALES   Head:               Normocephalic, atraumatic  CV:                  Irregular irregular No m/r/g. No jugular venous distension. Lungs:             CTAB. No wheezing, rhonchi, or rales. Symmetric expansion. Abdomen: Bowel sounds present. Soft, nontender, nondistended. Extremities:     No cyanosis or clubbing. No edema  Skin:                Pale. Warm and dry. Neuro:             Restless. CN II-XII grossly intact. Sensation intact. A&Ox1  Psych:             Normal mood and affect.          I have personally reviewed labs and tests showing:  Recent Labs:  Recent Results (from the past 48 hour(s))   Culture, Blood 1    Collection Time: 12/04/22  6:27 PM    Specimen: Blood   Result Value Ref Range    Special Requests NO SPECIAL REQUESTS  RIGHT  Antecubital        Culture NO GROWTH AFTER 14 HOURS     Lactate, Sepsis    Collection Time: 12/04/22  6:27 PM   Result Value Ref Range    Lactic Acid, Sepsis 2.2 (H) 0.4 - 2.0 MMOL/L   CBC with Auto Differential    Collection Time: 12/04/22  6:27 PM   Result Value Ref Range    WBC 13.3 (H) 4.3 - 11.1 K/uL    RBC 4.12 (L) 4.23 - 5.6 M/uL    Hemoglobin 12.0 (L) 13.6 - 17.2 g/dL    Hematocrit 37.8 (L) 41.1 - 50.3 %    MCV 91.7 82 - 102 FL    MCH 29.1 26.1 - 32.9 PG    MCHC 31.7 31.4 - 35.0 g/dL    RDW 13.5 11.9 - 14.6 %    Platelets 348 785 - 192 K/uL    MPV 10.4 9.4 - 12.3 FL    nRBC 0.00 0.0 - 0.2 K/uL    Differential Type AUTOMATED      Seg Neutrophils 88 (H) 43 - 78 %    Lymphocytes 3 (L) 13 - 44 %    Monocytes 7 4.0 - 12.0 %    Eosinophils % 0 (L) 0.5 - 7.8 %    Basophils 1 0.0 - 2.0 %    Immature Granulocytes 1 0.0 - 5.0 %    Segs Absolute 11.8 (H) 1.7 - 8.2 K/UL    Absolute Lymph # 0.5 0.5 - 4.6 K/UL    Absolute Mono # 0.9 0.1 - 1.3 K/UL    Absolute Eos # 0.0 0.0 - 0.8 K/UL    Basophils Absolute 0.1 0.0 - 0.2 K/UL    Absolute Immature Granulocyte 0.1 0.0 - 0.5 K/UL   CMP    Collection Time: 12/04/22  6:27 PM   Result Value Ref Range    Sodium 142 133 - 143 mmol/L    Potassium 4.6 3.5 - 5.1 mmol/L    Chloride 111 (H) 101 - 110 mmol/L    CO2 26 21 - 32 mmol/L    Anion Gap 5 2 - 11 mmol/L    Glucose 147 (H) 65 - 100 mg/dL    BUN 27 (H) 8 - 23 MG/DL    Creatinine 1.10 0.8 - 1.5 MG/DL    Est, Glom Filt Rate >60 >60 ml/min/1.73m2    Calcium 8.9 8.3 - 10.4 MG/DL    Total Bilirubin 0.7 0.2 - 1.1 MG/DL    ALT 19 12 - 65 U/L    AST 22 15 - 37 U/L    Alk Phosphatase 98 50 - 136 U/L    Total Protein 7.6 6.3 - 8.2 g/dL    Albumin 3.7 3.2 - 4.6 g/dL    Globulin 3.9 2.8 - 4.5 g/dL    Albumin/Globulin Ratio 0.9 0.4 - 1.6     Procalcitonin    Collection Time: 12/04/22  6:27 PM   Result Value Ref Range    Procalcitonin 0.06 0.00 - 0.49 ng/mL   Rapid influenza A/B antigens    Collection Time: 12/04/22  6:27 PM    Specimen: Nasal Washing   Result Value Ref Range    Influenza A Ag Negative NEG      Influenza B Ag Negative NEG      Source Nasopharyngeal     COVID-19, Rapid    Collection Time: 12/04/22  6:27 PM    Specimen: Nasopharyngeal   Result Value Ref Range    Source NASAL      SARS-CoV-2, Rapid Not detected NOTD     EKG 12 Lead    Collection Time: 12/04/22  6:29 PM   Result Value Ref Range    Ventricular Rate 99 BPM    Atrial Rate 109 BPM    QRS Duration 103 ms    Q-T Interval 352 ms    QTc Calculation (Bazett) 452 ms    R Axis 100 degrees    T Axis -79 degrees    Diagnosis Atrial fibrillation    Culture, Blood 2    Collection Time: 12/04/22  6:36 PM    Specimen: Blood   Result Value Ref Range    Special Requests NO SPECIAL REQUESTS  LEFT  Antecubital        Culture NO GROWTH AFTER 14 HOURS     POCT Urinalysis no Micro    Collection Time: 12/04/22  6:50 PM   Result Value Ref Range    Specific Gravity, Urine, POC >1.030 (H) 1.001 - 1.023    pH, Urine, POC 5.5 5.0 - 9.0      Protein, Urine, POC TRACE (A) NEG mg/dL    Glucose, UA POC Negative NEG mg/dL    Ketones, Urine, POC Negative NEG mg/dL    Bilirubin, Urine, POC SMALL (A) NEG      Blood, UA POC Negative NEG      URINE UROBILINOGEN POC 1.0 0.2 - 1.0 EU/dL    Nitrate, Urine, POC Negative NEG      Leukocyte Est, UA POC Negative NEG      Performed by: Guidecentraldivine Minion    Culture, Urine    Collection Time: 12/04/22  8:33 PM    Specimen: Urine    CATH URINE   Result Value Ref Range    Special Requests NO SPECIAL REQUESTS      Culture        No growth after short period of incubation. Further results to follow after overnight incubation. Lactate, Sepsis    Collection Time: 12/04/22  8:38 PM   Result Value Ref Range    Lactic Acid, Sepsis 1.4 0.4 - 2.0 MMOL/L   Respiratory Panel, Molecular, with COVID-19 (Restricted: peds pts or suitable admitted adults)    Collection Time: 12/04/22 11:17 PM    Specimen: Nasopharyngeal   Result Value Ref Range    Adenovirus by PCR NOT DETECTED NOTDET      Coronavirus 229E by PCR NOT DETECTED NOTDET      Coronavirus HKU1 by PCR NOT DETECTED NOTDET      Coronavirus NL63 by PCR NOT DETECTED NOTDET      Coronavirus OC43 by PCR NOT DETECTED NOTDET      SARS-CoV-2, PCR NOT DETECTED NOTDET      Human Metapneumovirus by PCR NOT DETECTED NOTDET      Rhinovirus Enterovirus PCR NOT DETECTED NOTDET      Influenza A by PCR NOT DETECTED NOTDET      Influenza B PCR NOT DETECTED NOTDET      Parainfluenza 1 PCR NOT DETECTED NOTDET      Parainfluenza 2 PCR NOT DETECTED NOTDET      Parainfluenza 3 PCR NOT DETECTED NOTDET      Parainfluenza 4 PCR NOT DETECTED NOTDET      Respiratory Syncytial Virus by PCR NOT DETECTED NOTDET      Bordetella parapertussis by PCR NOT DETECTED NOTDET      Bordetella pertussis by PCR NOT DETECTED NOTDET      Chlamydophila Pneumonia PCR NOT DETECTED NOTDET      Mycoplasma pneumo by PCR NOT DETECTED NOTDET         I have personally reviewed imaging studies showing:   Other Studies:  No orders to display       Current Meds:  Current Facility-Administered Medications   Medication Dose Route Frequency    cefTRIAXone (ROCEPHIN) 1,000 mg in sodium chloride 0.9 % 50 mL IVPB mini-bag  1,000 mg IntraVENous Q24H    0.9 % sodium chloride infusion   IntraVENous Continuous    sodium chloride flush 0.9 % injection 5-40 mL  5-40 mL IntraVENous 2 times per day    sodium chloride flush 0.9 % injection 5-40 mL  5-40 mL IntraVENous PRN    0.9 % sodium chloride infusion   IntraVENous PRN    ondansetron (ZOFRAN-ODT) disintegrating tablet 4 mg  4 mg Oral Q8H PRN    Or    ondansetron (ZOFRAN) injection 4 mg  4 mg IntraVENous Q6H PRN    polyethylene glycol (GLYCOLAX) packet 17 g  17 g Oral Daily PRN    acetaminophen (TYLENOL) tablet 650 mg  650 mg Oral Q6H PRN    Or    acetaminophen (TYLENOL) suppository 650 mg  650 mg Rectal Q6H PRN    tuberculin injection 5 Units  5 Units IntraDERmal Once       Signed:  Porfirio Barroso, APRN - CNP

## 2022-12-05 NOTE — ED NOTES
TRANSFER - OUT REPORT:    Verbal report given to Vangie Ingram RN on [address]  being transferred to [place] [address] for routine progression of patient care       Report consisted of patient's Situation, Background, Assessment and   Recommendations(SBAR). Information from the following report(s) Nurse Handoff Report, ED SBAR, Adult Overview, Intake/Output, MAR, and Recent Results was reviewed with the receiving nurse. [place] Assessment: No data recorded  Lines:   Peripheral IV 12/04/22 Right Antecubital (Active)   Site Assessment Clean, dry & intact 12/04/22 2218   Line Status Blood return noted 12/04/22 2218   Phlebitis Assessment No symptoms 12/04/22 2218   Infiltration Assessment 0 12/04/22 2218        Opportunity for questions and clarification was provided.       Patient transported with:  Samantha Mcdermott RN  12/04/22 [id number]

## 2022-12-05 NOTE — H&P
Hospitalist History and Physical   Admit Date:  2022  6:18 PM   Name:  Garret Gaming Sr.   Age:  80 y.o. Sex:  male  :  1932   MRN:  [id number]   Room:  ER/    Presenting Complaint: Fever and Emesis     Reason(s) for Admission: Sepsis      History of Present Illness:   Jose Enrique Erickson is a 80 y.o. male with medical history of vascular dementia, AFIB on apixaban who presented from memory clinic with fever and AMS. In ED, T 102.7  RR 33. He has severe dementia but normally more alert per son. Labs showed elevated LA 2.2  WBC 13.3 UA concentrated but bland. Rapid covid/influenza negative. Procal low. CXR chronic intersitial changes suspected. EKG a fib . He rec'd 1 L NS rocephin and tyelnol and LA normalized and vital signs improved. Patient unable to give history. Review of Systems:  Unable to perform 2/2 dementia   Assessment & Plan:     Sepsis - unclear etiology. Repeat 1 L bolus f/b 100 cc/hr. Cont. Rocephin. Check respiratory PCR panel. FU pan cultures. AFIB - rate controlled s/p IVF. Cont BB and apixaban. Vascular dementia with acute delirium 2/2 sepsis of unclear source - zyprexa 5 mg IM now to reduce self harm       Anticipated discharge needs:     Return to memory clinic     Diet: easy to chew   VTE ppx: apixaban   Code status: DNR    Hospital Problems:  Active Problems:    Sepsis ([facility]    Vascular dementia with delirium ([facility]    A-fib ([facility]    Current use of long term anticoagulation  Resolved Problems:    * No resolved hospital problems.  *       Past History:     Past Medical History:   Diagnosis Date    Anxiety disorder 2015    Cancer [facility])     skin    Chest pain     Depression     Diabetes ([facility]     borderline    Elevated LFTs     Transient Elevated Lft's    Hypercholesterolemia     Hyperglycemia     Hyperlipidemia 2015    Hypertension     Ischemic     Ischemic ulceration    Neuropathy, lower extremity     Ulcer     Bleeding ulcers Past Surgical History:   Procedure Laterality Date    COLONOSCOPY      GI      bleeding ulcer    HERNIA REPAIR      double    ORTHOPEDIC SURGERY      wart removed    VASCULAR SURGERY Left 5-26-15    arteriogram    VASCULAR SURGERY Left 4/10/15    LE arteriogram        Social History     Tobacco Use    Smoking status: Never    Smokeless tobacco: Never   Substance Use Topics    Alcohol use: No      Social History     Substance and Sexual Activity   Drug Use Not on file       Family History   Problem Relation Age of Onset    Heart Disease Father         CHF    Lung Disease Mother     Anxiety Disorder Father         Immunization History   Administered Date(s) Administered    Influenza Trivalent 11/14/2017    Influenza, FLUARIX, FLULAVAL, FLUZONE (age 10 mo+) AND AFLURIA, (age 1 y+), PF, 0.5mL 11/07/2018    Influenza, High Dose (Fluzone 65 yrs and older) 09/10/2014, 09/11/2015, 10/04/2016    Influenza, Triv, inactivated, subunit, adjuvanted, IM (Fluad 65 yrs and older) 10/22/2019     Allergies   Allergen Reactions    Menthol Other (See Comments)     Ludens cough drops, tongue felt like it was burning    Oxaprozin Other (See Comments)    Trimethobenzamide Other (See Comments)     Prior to Admit Medications:  Current Outpatient Medications   Medication Instructions    apixaban (ELIQUIS) 5 MG TABS tablet TAKE 1 TABLET BY MOUTH TWICE DAILY    metoprolol succinate (TOPROL XL) 25 MG extended release tablet TAKE 1 TABLET BY MOUTH ONCE DAILY *DO NOT CRUSH OR CHEW*         Objective:   Patient Vitals for the past 24 hrs:   Temp Pulse Resp BP SpO2   12/04/22 2321 -- 100 -- -- 93 %   12/04/22 2318 -- -- 18 (!) 128/104 --   12/04/22 2301 -- (!) 107 -- -- 96 %   12/04/22 2251 -- -- -- 114/71 --   12/04/22 2231 -- 91 -- -- --   12/04/22 2230 -- -- 26 130/80 --   12/04/22 2225 -- -- -- -- 98 %   12/04/22 2215 -- (!) 115 14 109/79 96 %   12/04/22 2155 -- 83 13 133/70 --   12/04/22 2145 -- 92 23 120/74 --   12/04/22 2125 -- 99 19 135/76 --   12/04/22 2115 -- 93 16 135/80 --   12/04/22 2103 -- 97 28 133/71 --   12/04/22 2046 99.7 °F (37.6 °C) 84 17 -- --   12/04/22 2045 -- -- -- (!) 150/71 --   12/04/22 2033 -- 97 23 (!) 143/74 98 %   12/04/22 1948 -- 96 (!) 33 132/73 94 %   12/04/22 1845 (!) 102.7 °F (39.3 °C) (!) 137 26 (!) 143/129 93 %   12/04/22 1830 -- (!) 114 20 138/62 --       Oxygen Therapy  SpO2: 93 %  O2 Device: None (Room air)    Estimated body mass index is 21.7 kg/m² as calculated from the following:    Height as of 4/11/21: 6' (1.829 m). Weight as of 2/25/22: 160 lb (72.6 kg). No intake or output data in the 24 hours ending 12/04/22 2325      Physical Exam:    Blood pressure (!) 128/104, pulse 100, temperature 99.7 °F (37.6 °C), temperature source Rectal, resp. rate 18, SpO2 93 %. General:    Elderly male. Shivering. NAD   Head:  Normocephalic, atraumatic  Eyes:  Sclerae appear normal.  Pupils equally round. ENT:  Nares appear normal, no drainage. Moist oral mucosa  Neck:  No restricted ROM. Trachea midline   CV:   Irregular irregular No m/r/g. No jugular venous distension. Lungs:   CTAB. No wheezing, rhonchi, or rales. Symmetric expansion. Abdomen: Bowel sounds present. Soft, nontender, nondistended. Extremities: No cyanosis or clubbing. No edema  Skin:     Pale. Warm and dry. Neuro:  Restless. CN II-XII grossly intact. Sensation intact. A&Ox1  Psych:  Normal mood and affect.       I have personally reviewed labs and tests showing:  Recent Labs:  Recent Results (from the past 24 hour(s))   Lactate, Sepsis    Collection Time: 12/04/22  6:27 PM   Result Value Ref Range    Lactic Acid, Sepsis 2.2 (H) 0.4 - 2.0 MMOL/L   CBC with Auto Differential    Collection Time: 12/04/22  6:27 PM   Result Value Ref Range    WBC 13.3 (H) 4.3 - 11.1 K/uL    RBC 4.12 (L) 4.23 - 5.6 M/uL    Hemoglobin 12.0 (L) 13.6 - 17.2 g/dL    Hematocrit 37.8 (L) 41.1 - 50.3 %    MCV 91.7 82 - 102 FL    MCH 29.1 26.1 - 32.9 PG    MCHC 31.7 31.4 - 35.0 g/dL    RDW 13.5 11.9 - 14.6 %    Platelets 008 999 - 315 K/uL    MPV 10.4 9.4 - 12.3 FL    nRBC 0.00 0.0 - 0.2 K/uL    Differential Type AUTOMATED      Seg Neutrophils 88 (H) 43 - 78 %    Lymphocytes 3 (L) 13 - 44 %    Monocytes 7 4.0 - 12.0 %    Eosinophils % 0 (L) 0.5 - 7.8 %    Basophils 1 0.0 - 2.0 %    Immature Granulocytes 1 0.0 - 5.0 %    Segs Absolute 11.8 (H) 1.7 - 8.2 K/UL    Absolute Lymph # 0.5 0.5 - 4.6 K/UL    Absolute Mono # 0.9 0.1 - 1.3 K/UL    Absolute Eos # 0.0 0.0 - 0.8 K/UL    Basophils Absolute 0.1 0.0 - 0.2 K/UL    Absolute Immature Granulocyte 0.1 0.0 - 0.5 K/UL   CMP    Collection Time: 12/04/22  6:27 PM   Result Value Ref Range    Sodium 142 133 - 143 mmol/L    Potassium 4.6 3.5 - 5.1 mmol/L    Chloride 111 (H) 101 - 110 mmol/L    CO2 26 21 - 32 mmol/L    Anion Gap 5 2 - 11 mmol/L    Glucose 147 (H) 65 - 100 mg/dL    BUN 27 (H) 8 - 23 MG/DL    Creatinine 1.10 0.8 - 1.5 MG/DL    Est, Glom Filt Rate >60 >60 ml/min/1.73m2    Calcium 8.9 8.3 - 10.4 MG/DL    Total Bilirubin 0.7 0.2 - 1.1 MG/DL    ALT 19 12 - 65 U/L    AST 22 15 - 37 U/L    Alk Phosphatase 98 50 - 136 U/L    Total Protein 7.6 6.3 - 8.2 g/dL    Albumin 3.7 3.2 - 4.6 g/dL    Globulin 3.9 2.8 - 4.5 g/dL    Albumin/Globulin Ratio 0.9 0.4 - 1.6     Procalcitonin    Collection Time: 12/04/22  6:27 PM   Result Value Ref Range    Procalcitonin 0.06 0.00 - 0.49 ng/mL   Rapid influenza A/B antigens    Collection Time: 12/04/22  6:27 PM    Specimen: Nasal Washing   Result Value Ref Range    Influenza A Ag Negative NEG      Influenza B Ag Negative NEG      Source Nasopharyngeal     COVID-19, Rapid    Collection Time: 12/04/22  6:27 PM    Specimen: Nasopharyngeal   Result Value Ref Range    Source NASAL      SARS-CoV-2, Rapid Not detected NOTD     EKG 12 Lead    Collection Time: 12/04/22  6:29 PM   Result Value Ref Range    Ventricular Rate 99 BPM    Atrial Rate 109 BPM    QRS Duration 103 ms    Q-T Interval 352 ms    QTc Calculation (Bazett) 452 ms    R Axis 100 degrees    T Axis -79 degrees    Diagnosis Atrial fibrillation    POCT Urinalysis no Micro    Collection Time: 12/04/22  6:50 PM   Result Value Ref Range    Specific Gravity, Urine, POC >1.030 (H) 1.001 - 1.023    pH, Urine, POC 5.5 5.0 - 9.0      Protein, Urine, POC TRACE (A) NEG mg/dL    Glucose, UA POC Negative NEG mg/dL    Ketones, Urine, POC Negative NEG mg/dL    Bilirubin, Urine, POC SMALL (A) NEG      Blood, UA POC Negative NEG      URINE UROBILINOGEN POC 1.0 0.2 - 1.0 EU/dL    Nitrate, Urine, POC Negative NEG      Leukocyte Est, UA POC Negative NEG      Performed by: Mat Hardin    Lactate, Sepsis    Collection Time: 12/04/22  8:38 PM   Result Value Ref Range    Lactic Acid, Sepsis 1.4 0.4 - 2.0 MMOL/L       I have personally reviewed imaging studies showing:  XR CHEST PORTABLE    Result Date: 12/4/2022  Portable chest x-ray CLINICAL INDICATION: Sepsis FINDINGS: Single AP view the chest compared to a similar exam dated 2/24/2022 shows no change in the mild reticular nodular interstitial prominence. There is no confluent airspace opacity. No pleural effusion. The cardiac silhouette and mediastinum are unremarkable. Chronic interstitial changes suspected. No acute abnormality. Echocardiogram:  No results found for this or any previous visit. Orders Placed This Encounter   Medications    acetaminophen (TYLENOL) suppository 650 mg    cefTRIAXone (ROCEPHIN) 1,000 mg in sodium chloride 0.9 % 50 mL IVPB mini-bag     Order Specific Question:   Antimicrobial Indications     Answer:   Urinary Tract Infection     Order Specific Question:   Antimicrobial Indications     Answer:    Other     Order Specific Question:   Other Abx Indication     Answer:   Sepsis    0.9 % sodium chloride bolus    0.9 % sodium chloride bolus    OLANZapine (ZYPREXA) 5 mg in sterile water 1 mL injection         Signed:  Nori Marcelo DO, DO    Part of this note may have been written by using a voice dictation software. The note has been proof read but may still contain some grammatical/other typographical errors.

## 2022-12-05 NOTE — ED NOTES
Roni Soares from [facility] informed of patient's admission with family approval for release of information to them.      Jes Whitfield RN  12/04/22 2041

## 2022-12-06 ENCOUNTER — APPOINTMENT (OUTPATIENT)
Dept: NON INVASIVE DIAGNOSTICS | Age: 87
DRG: 871 | End: 2022-12-06
Attending: FAMILY MEDICINE
Payer: MEDICARE

## 2022-12-06 LAB
ACCESSION NUMBER, LLC1M: ABNORMAL
ACINETOBACTER CALCOAC BAUMANNII COMPLEX BY PCR: NOT DETECTED
ANION GAP SERPL CALC-SCNC: 5 MMOL/L (ref 2–11)
BACTERIA SPEC CULT: NORMAL
BACTEROIDES FRAGILIS BY PCR: NOT DETECTED
BASOPHILS # BLD: 0.1 K/UL (ref 0–0.2)
BASOPHILS NFR BLD: 1 % (ref 0–2)
BIOFIRE TEST COMMENT: ABNORMAL
BUN SERPL-MCNC: 17 MG/DL (ref 8–23)
C ALBICANS DNA BLD POS QL NAA+NON-PROBE: NOT DETECTED
C GLABRATA DNA BLD POS QL NAA+NON-PROBE: NOT DETECTED
C KRUSEI DNA BLD POS QL NAA+NON-PROBE: NOT DETECTED
C PARAP DNA BLD POS QL NAA+NON-PROBE: NOT DETECTED
C TROPICLS DNA BLD POS QL NAA+NON-PROBE: NOT DETECTED
CALCIUM SERPL-MCNC: 8 MG/DL (ref 8.3–10.4)
CANDIDA AURIS BY PCR: NOT DETECTED
CHLORIDE SERPL-SCNC: 113 MMOL/L (ref 101–110)
CO2 SERPL-SCNC: 25 MMOL/L (ref 21–32)
CREAT SERPL-MCNC: 0.74 MG/DL (ref 0.8–1.5)
CRYPTOCOCCUS NEOFORMANS/GATTII BY PCR: NOT DETECTED
DIFFERENTIAL METHOD BLD: ABNORMAL
E CLOAC COMP DNA BLD POS NAA+NON-PROBE: NOT DETECTED
E COLI DNA BLD POS QL NAA+NON-PROBE: NOT DETECTED
ENTEROBACTERALES BY PCR: NOT DETECTED
ENTEROCOCCUS FAECALIS BY PCR: NOT DETECTED
ENTEROCOCCUS FAECIUM BY PCR: NOT DETECTED
EOSINOPHIL # BLD: 0.3 K/UL (ref 0–0.8)
EOSINOPHIL NFR BLD: 3 % (ref 0.5–7.8)
ERYTHROCYTE [DISTWIDTH] IN BLOOD BY AUTOMATED COUNT: 13.6 % (ref 11.9–14.6)
GLUCOSE SERPL-MCNC: 90 MG/DL (ref 65–100)
GP B STREP DNA BLD POS QL NAA+NON-PROBE: NOT DETECTED
HAEM INFLU DNA BLD POS QL NAA+NON-PROBE: NOT DETECTED
HCT VFR BLD AUTO: 30.7 % (ref 41.1–50.3)
HGB BLD-MCNC: 9.6 G/DL (ref 13.6–17.2)
IMM GRANULOCYTES # BLD AUTO: 0 K/UL (ref 0–0.5)
IMM GRANULOCYTES NFR BLD AUTO: 0 % (ref 0–5)
K OXYTOCA DNA BLD POS QL NAA+NON-PROBE: NOT DETECTED
KLEBSIELLA AEROGENES BY PCR: NOT DETECTED
KLEBSIELLA PNEUMONIAE GROUP BY PCR: NOT DETECTED
L MONOCYTOG DNA BLD POS QL NAA+NON-PROBE: NOT DETECTED
LYMPHOCYTES # BLD: 1.3 K/UL (ref 0.5–4.6)
LYMPHOCYTES NFR BLD: 16 % (ref 13–44)
MAGNESIUM SERPL-MCNC: 2.1 MG/DL (ref 1.8–2.4)
MCH RBC QN AUTO: 28.7 PG (ref 26.1–32.9)
MCHC RBC AUTO-ENTMCNC: 31.3 G/DL (ref 31.4–35)
MCV RBC AUTO: 91.9 FL (ref 82–102)
MECA+MECC ISLT/SPM QL: DETECTED
MONOCYTES # BLD: 1.1 K/UL (ref 0.1–1.3)
MONOCYTES NFR BLD: 14 % (ref 4–12)
N MEN DNA BLD POS QL NAA+NON-PROBE: NOT DETECTED
NEUTS SEG # BLD: 5.2 K/UL (ref 1.7–8.2)
NEUTS SEG NFR BLD: 66 % (ref 43–78)
NRBC # BLD: 0 K/UL (ref 0–0.2)
P AERUGINOSA DNA BLD POS NAA+NON-PROBE: NOT DETECTED
PLATELET # BLD AUTO: 191 K/UL (ref 150–450)
PMV BLD AUTO: 9.9 FL (ref 9.4–12.3)
POTASSIUM SERPL-SCNC: 3.4 MMOL/L (ref 3.5–5.1)
PROTEUS SP DNA BLD POS QL NAA+NON-PROBE: NOT DETECTED
RBC # BLD AUTO: 3.34 M/UL (ref 4.23–5.6)
RESISTANT GENE TARGETS: ABNORMAL
S AUREUS DNA BLD POS QL NAA+NON-PROBE: NOT DETECTED
S AUREUS+CONS DNA BLD POS NAA+NON-PROBE: DETECTED
S MARCESCENS DNA BLD POS NAA+NON-PROBE: NOT DETECTED
S PNEUM DNA BLD POS QL NAA+NON-PROBE: NOT DETECTED
S PYO DNA BLD POS QL NAA+NON-PROBE: NOT DETECTED
SALMONELLA SPECIES BY PCR: NOT DETECTED
SERVICE CMNT-IMP: NORMAL
SODIUM SERPL-SCNC: 143 MMOL/L (ref 133–143)
STAPHYLOCOCCUS EPIDERMIDIS BY PCR: DETECTED
STAPHYLOCOCCUS LUGDUNENSIS BY PCR: NOT DETECTED
STENOTROPHOMONAS MALTOPHILIA BY PCR: NOT DETECTED
STREPTOCOCCUS DNA BLD POS NAA+NON-PROBE: NOT DETECTED
WBC # BLD AUTO: 7.9 K/UL (ref 4.3–11.1)

## 2022-12-06 PROCEDURE — 2580000003 HC RX 258: Performed by: HOSPITALIST

## 2022-12-06 PROCEDURE — 2580000003 HC RX 258: Performed by: NURSE PRACTITIONER

## 2022-12-06 PROCEDURE — 80048 BASIC METABOLIC PNL TOTAL CA: CPT

## 2022-12-06 PROCEDURE — 6360000002 HC RX W HCPCS: Performed by: HOSPITALIST

## 2022-12-06 PROCEDURE — 1100000003 HC PRIVATE W/ TELEMETRY

## 2022-12-06 PROCEDURE — 6370000000 HC RX 637 (ALT 250 FOR IP): Performed by: NURSE PRACTITIONER

## 2022-12-06 PROCEDURE — 87040 BLOOD CULTURE FOR BACTERIA: CPT

## 2022-12-06 PROCEDURE — 97161 PT EVAL LOW COMPLEX 20 MIN: CPT

## 2022-12-06 PROCEDURE — 36415 COLL VENOUS BLD VENIPUNCTURE: CPT

## 2022-12-06 PROCEDURE — 6360000002 HC RX W HCPCS: Performed by: NURSE PRACTITIONER

## 2022-12-06 PROCEDURE — 97165 OT EVAL LOW COMPLEX 30 MIN: CPT

## 2022-12-06 PROCEDURE — 87641 MR-STAPH DNA AMP PROBE: CPT

## 2022-12-06 PROCEDURE — 83735 ASSAY OF MAGNESIUM: CPT

## 2022-12-06 PROCEDURE — 85025 COMPLETE CBC W/AUTO DIFF WBC: CPT

## 2022-12-06 PROCEDURE — 97535 SELF CARE MNGMENT TRAINING: CPT

## 2022-12-06 PROCEDURE — 97530 THERAPEUTIC ACTIVITIES: CPT

## 2022-12-06 RX ORDER — OMEGA-3S/DHA/EPA/FISH OIL/D3 300MG-1000
400 CAPSULE ORAL DAILY
COMMUNITY

## 2022-12-06 RX ORDER — ACETAMINOPHEN 500 MG
500 TABLET ORAL 3 TIMES DAILY
COMMUNITY

## 2022-12-06 RX ORDER — LORAZEPAM 0.5 MG/1
0.5 TABLET ORAL EVERY 8 HOURS PRN
COMMUNITY

## 2022-12-06 RX ORDER — LANOLIN ALCOHOL/MO/W.PET/CERES
9 CREAM (GRAM) TOPICAL
COMMUNITY

## 2022-12-06 RX ORDER — QUETIAPINE FUMARATE 50 MG/1
50 TABLET, FILM COATED ORAL DAILY
COMMUNITY

## 2022-12-06 RX ADMIN — ONDANSETRON 4 MG: 2 INJECTION INTRAMUSCULAR; INTRAVENOUS at 20:16

## 2022-12-06 RX ADMIN — SODIUM CHLORIDE, PRESERVATIVE FREE 10 ML: 5 INJECTION INTRAVENOUS at 21:35

## 2022-12-06 RX ADMIN — VANCOMYCIN HYDROCHLORIDE 1500 MG: 10 INJECTION, POWDER, LYOPHILIZED, FOR SOLUTION INTRAVENOUS at 12:24

## 2022-12-06 RX ADMIN — APIXABAN 5 MG: 5 TABLET, FILM COATED ORAL at 12:00

## 2022-12-06 RX ADMIN — CEFTRIAXONE 1000 MG: 1 INJECTION, POWDER, FOR SOLUTION INTRAMUSCULAR; INTRAVENOUS at 18:14

## 2022-12-06 RX ADMIN — SODIUM CHLORIDE: 9 INJECTION, SOLUTION INTRAVENOUS at 01:20

## 2022-12-06 RX ADMIN — METOPROLOL SUCCINATE 25 MG: 25 TABLET, EXTENDED RELEASE ORAL at 09:10

## 2022-12-06 RX ADMIN — SODIUM CHLORIDE, PRESERVATIVE FREE 10 ML: 5 INJECTION INTRAVENOUS at 09:10

## 2022-12-06 RX ADMIN — QUETIAPINE FUMARATE 50 MG: 25 TABLET ORAL at 21:44

## 2022-12-06 RX ADMIN — APIXABAN 5 MG: 5 TABLET, FILM COATED ORAL at 21:35

## 2022-12-06 NOTE — CONSULTS
Infectious Disease Consult      Today's Date: 12/6/2022   Admit Date: 12/5/2022    Impression:   Patient is a 80year old male, admitted with febrile illness    #Febrile illness  - Unclear etiology. 1 out of 2 blood cx positive for CoNS  - Improved fever without therapy directed at CoNS, currently on CTX for presumed UTI, negative urine cx  - Has right heel ulcer with mild erythema - ? Cellulitis - heel ulcer does not appear deep, but needs careful monitoring as can progress - engage wound care  - Can continue ceftriaxone for 5 days and dc     Plan:   Complete 5 days of ceftriaxone   Dc vancomycin - if repeat cultures positive for CoNS re-engage ID   Engage wound care for heel ulcer. Carries risk of progression   Thank you for allowing us to participate in the care of this patient. Please don't hesitate to contact us with further questions or concerns. ID will sign off     Anti-infectives:   CTX 12/5- present  Vancomycin 12/6    Subjective:   Date of Consultation:  December 6, 2022  Date of Admission: 12/5/2022   Referring Provider: Dr Roberto Carlos Jay    Patient is a 80 y.o. male with PMH of dementia, currently in memory care was seen on 12/4/22 with primary complaint of a fever and AMS  As part of his work-up, he had blood cultures drawn which have grown 1 out 2 CoNS. Patient was placed on ceftriaxone for presumed UTI, Urine cultures only show normal beulah  He had a CXR which showed chronic interstitial findings  A respiratory panel was negative, COVID-19 was negative. His white count improved from 13,000 to normal and patient has been afebrile for 48hrs    At the time of evaluation, patient appeared delirious and uncooperative.  He was not able to give a history    Patient Active Problem List   Diagnosis    Hypercholesterolemia    Leg edema, right    Hypertension    Depression    Anxiety disorder    Hyperlipidemia    Atherosclerosis of native arteries of the extremities with ulceration ([address]    Sepsis ([address]    Vascular dementia with delirium ([facility]    A-fib ([facility]    Current use of long term anticoagulation    Acute encephalopathy     Past Medical History:   Diagnosis Date    Anxiety disorder 7/7/2015    Cancer ([facility]     skin    Chest pain     Depression     Diabetes ([facility]     borderline    Elevated LFTs     Transient Elevated Lft's    Hypercholesterolemia     Hyperglycemia     Hyperlipidemia 7/7/2015    Hypertension     Ischemic     Ischemic ulceration    Neuropathy, lower extremity     Ulcer     Bleeding ulcers      Family History   Problem Relation Age of Onset    Heart Disease Father         CHF    Lung Disease Mother     Anxiety Disorder Father       Social History     Tobacco Use    Smoking status: Never    Smokeless tobacco: Never   Substance Use Topics    Alcohol use: No     Past Surgical History:   Procedure Laterality Date    COLONOSCOPY      GI      bleeding ulcer    HERNIA REPAIR      double    ORTHOPEDIC SURGERY      wart removed    VASCULAR SURGERY Left 5-26-15    arteriogram    VASCULAR SURGERY Left 4/10/15    LE arteriogram      Prior to Admission medications    Medication Sig Start Date End Date Taking? Authorizing Provider   apixaban (ELIQUIS) 5 MG TABS tablet TAKE 1 TABLET BY MOUTH TWICE DAILY 11/24/20   Ar Automatic Reconciliation   metoprolol succinate (TOPROL XL) 25 MG extended release tablet TAKE 1 TABLET BY MOUTH ONCE DAILY *DO NOT CRUSH OR CHEW* 11/24/20   Ar Automatic Reconciliation     Allergies   Allergen Reactions    Olive Oil Anaphylaxis    Menthol Other (See Comments)     Ludens cough drops, tongue felt like it was burning    Oxaprozin Other (See Comments)    Trimethobenzamide Other (See Comments)        Review of Systems:  Unable to obtain due to patient's delirium    Objective:   Blood pressure (!) 147/96, pulse 100, temperature 98.2 °F (36.8 °C), temperature source Axillary, resp. rate 20, height 5' 9\" (1.753 m), weight 162 lb (73.5 kg), SpO2 99 %.   Visit Vitals  BP (!) 147/96   Pulse 100   Temp 98.2 °F (36.8 °C) (Axillary)   Resp 20   Ht 5' 9\" (1.753 m)   Wt 162 lb (73.5 kg)   SpO2 99%   BMI 23.92 kg/m²     Temp (24hrs), Av.7 °F (36.5 °C), Min:97 °F (36.1 °C), Max:98.2 °F (36.8 °C)       Exam:    General:  Alert, non-cooperative, delirious   Eyes:  Sclera anicteric. Pupils equally round and reactive to light. Mouth/Throat: Mucous membranes normal, oral pharynx clear   Neck: Supple   Lungs:   Normal excursions   CV:  Regular rate and rhythm   Abdomen:   Soft, non-tender.  bowel sounds normal. non-distended   Extremities: Small  heel ulcer with mild surrounding erythema, right toe with small eschar on big toe                Lines/Devices:  Intact, no erythema, drainage or tenderness   Psych: Appears delirious       CBC:  Recent Labs     227 22  0440   WBC 13.3* 7.9   HGB 12.0* 9.6*   HCT 37.8* 30.7*    191       BMP:  Recent Labs     22  0440   BUN 27* 17    143   K 4.6 3.4*   * 113*   CO2 26 25       LFTS:  Recent Labs     22   ALT 19       Data Review:   Recent Results (from the past 24 hour(s))   CBC with Auto Differential    Collection Time: 22  4:40 AM   Result Value Ref Range    WBC 7.9 4.3 - 11.1 K/uL    RBC 3.34 (L) 4.23 - 5.6 M/uL    Hemoglobin 9.6 (L) 13.6 - 17.2 g/dL    Hematocrit 30.7 (L) 41.1 - 50.3 %    MCV 91.9 82.0 - 102.0 FL    MCH 28.7 26.1 - 32.9 PG    MCHC 31.3 (L) 31.4 - 35.0 g/dL    RDW 13.6 11.9 - 14.6 %    Platelets 462 355 - 303 K/uL    MPV 9.9 9.4 - 12.3 FL    nRBC 0.00 0.0 - 0.2 K/uL    Differential Type AUTOMATED      Seg Neutrophils 66 43 - 78 %    Lymphocytes 16 13 - 44 %    Monocytes 14 (H) 4.0 - 12.0 %    Eosinophils % 3 0.5 - 7.8 %    Basophils 1 0.0 - 2.0 %    Immature Granulocytes 0 0.0 - 5.0 %    Segs Absolute 5.2 1.7 - 8.2 K/UL    Absolute Lymph # 1.3 0.5 - 4.6 K/UL    Absolute Mono # 1.1 0.1 - 1.3 K/UL    Absolute Eos # 0.3 0.0 - 0.8 K/UL    Basophils Absolute 0.1 0.0 - 0.2 K/UL    Absolute Immature Granulocyte 0.0 0.0 - 0.5 K/UL   Basic Metabolic Panel w/ Reflex to MG    Collection Time: 12/06/22  4:40 AM   Result Value Ref Range    Sodium 143 133 - 143 mmol/L    Potassium 3.4 (L) 3.5 - 5.1 mmol/L    Chloride 113 (H) 101 - 110 mmol/L    CO2 25 21 - 32 mmol/L    Anion Gap 5 2 - 11 mmol/L    Glucose 90 65 - 100 mg/dL    BUN 17 8 - 23 MG/DL    Creatinine 0.74 (L) 0.8 - 1.5 MG/DL    Est, Glom Filt Rate >60 >60 ml/min/1.73m2    Calcium 8.0 (L) 8.3 - 10.4 MG/DL   Magnesium    Collection Time: 12/06/22  4:40 AM   Result Value Ref Range    Magnesium 2.1 1.8 - 2.4 mg/dL        Microbiology:  Reviewed    Studies:  Reviewed    Signed By: Daniel Graves MD     December 6, 2022

## 2022-12-06 NOTE — PROGRESS NOTES
Hospitalist Progress Note   Admit Date:  2022  3:18 AM   Name:  Rob Grey Sr.   Age:  80 y.o. Sex:  male  :  1932   MRN:  [id number]   Room:  [facility]/    Presenting Complaint: No chief complaint on file. 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 male with medical history of vascular dementia, AFIB on apixaban admitted  for acute encephalopathy related to UTI. He met sepsis criteria. Started on Rocephin. . Rapid covid/influenza negative. Subjective & 24hr Events (22): Pt up in room rearranging furniture. Does not follow commands. Confused. Nursing at bedside. BC MRSA +      Assessment & Plan:       Acute encephalopathy  Plan: likely from UTI  Trend urine and BC x2   BC with MRSA  Repeat BC  Start Vanc  Consult ID  echo     Sepsis:  Continue with plan for above     A Fib:  BB, Eliquis resumed  HR controlled     Vascular dementia:  Seroquel resumed  Melatonin resumed for sleep hygiene.  needs sitter, fall and flight risk, staff aware     Essential HTN:  Controlled  BB          Anticipated discharge needs:      TBD     Diet:  ADULT DIET; Easy to Chew  DVT PPx: Eliquis  Code status: DNR    Hospital Problems:  Principal Problem:    Acute encephalopathy  Resolved Problems:    * No resolved hospital problems. *      Objective:   Patient Vitals for the past 24 hrs:   Temp Pulse Resp BP SpO2   22 0446 97.8 °F (36.6 °C) 79 20 (!) 144/77 97 %   22 2350 97 °F (36.1 °C) 65 20 137/83 92 %   22 1910 97.5 °F (36.4 °C) 66 20 (!) 106/58 95 %   22 1334 97.8 °F (36.6 °C) 66 21 104/83 98 %       Oxygen Therapy  SpO2: 97 %  O2 Device: None (Room air)    Estimated body mass index is 23.92 kg/m² as calculated from the following:    Height as of this encounter: 5' 9\" (1.753 m). Weight as of this encounter: 162 lb (73.5 kg).     Intake/Output Summary (Last 24 hours) at 12/6/2022 1057  Last data filed at 12/6/2022 0138  Gross per 24 hour   Intake --   Output 325 ml   Net -325 ml         Physical Exam:     Blood pressure (!) 144/77, pulse 79, temperature 97.8 °F (36.6 °C), temperature source Axillary, resp. rate 20, height 5' 9\" (1.753 m), weight 162 lb (73.5 kg), SpO2 97 %. General:          Elderly male. Sobeida Luster NAD   Head:               Normocephalic, atraumatic  CV:                  Irregular irregular No m/r/g. No jugular venous distension. Lungs:             CTAB. No wheezing, rhonchi, or rales. Symmetric expansion. Abdomen: Bowel sounds present. Soft, nontender, nondistended. Extremities:     No cyanosis or clubbing. No edema  Skin:                Pale. Warm and dry. Neuro:             Restless. CN II-XII grossly intact. Sensation intact. Alert, confused, does not follow commands  Psych:             Normal mood and affect.       I have personally reviewed labs and tests showing:  Recent Labs:  Recent Results (from the past 48 hour(s))   Culture, Blood 1    Collection Time: 12/04/22  6:27 PM    Specimen: Blood   Result Value Ref Range    Special Requests NO SPECIAL REQUESTS  RIGHT  Antecubital        Gram stain GRAM POS COCCI IN CLUSTERS      Gram stain ANAEROBIC BOTTLE POSITIVE      Gram stain        RESULTS VERIFIED, PHONED TO AND READ BACK BY  [address] AT 0831 ON 12/6/22, KURTIS    Culture CULTURE IN PROGRESS,FURTHER UPDATES TO FOLLOW      Culture        Refer to Blood Culture ID Panel Accession [id number]   Lactate, Sepsis    Collection Time: 12/04/22  6:27 PM   Result Value Ref Range    Lactic Acid, Sepsis 2.2 (H) 0.4 - 2.0 MMOL/L   CBC with Auto Differential    Collection Time: 12/04/22  6:27 PM   Result Value Ref Range    WBC 13.3 (H) 4.3 - 11.1 K/uL    RBC 4.12 (L) 4.23 - 5.6 M/uL    Hemoglobin 12.0 (L) 13.6 - 17.2 g/dL    Hematocrit 37.8 (L) 41.1 - 50.3 %    MCV 91.7 82 - 102 FL    MCH 29.1 26.1 - 32.9 PG    MCHC 31.7 31.4 - Enterococcus faecium by PCR NOT DETECTED NOTDET      Listeria monocytogenes by PCR NOT DETECTED NOTDET      STAPHYLOCOCCUS Detected (A) NOTDET      Staphylococcus Aureus NOT DETECTED NOTDET      Staphylococcus epidermidis by PCR Detected (A) NOTDET      Staphylococcus lugdunensis by PCR NOT DETECTED NOTDET      STREPTOCOCCUS NOT DETECTED NOTDET      Streptococcus agalactiae (Group B) NOT DETECTED NOTDET      Strep pneumoniae NOT DETECTED NOTDET      Strep pyogenes,(Grp. A) NOT DETECTED NOTDET      Acinetobacter calcoac baumannii complex by PCR NOT DETECTED NOTDET      Bacteroides fragilis by PCR NOT DETECTED NOTDET      Enterobacteriaceae by PCR NOT DETECTED NOTDET      Enterobacter cloacae complex by PCR NOT DETECTED NOTDET      Escherichia Coli NOT DETECTED NOTDET      Klebsiella aerogenes by PCR NOT DETECTED NOTDET      Klebsiella oxytoca by PCR NOT DETECTED NOTDET      Klebsiella pneumoniae group by PCR NOT DETECTED NOTDET      Proteus by PCR NOT DETECTED NOTDET      Salmonella species by PCR NOT DETECTED NOTDET      Serratia marcescens by PCR NOT DETECTED NOTDET      Haemophilus Influenzae by PCR NOT DETECTED NOTDET      Neisseria meningitidis by PCR NOT DETECTED NOTDET      Pseudomonas aeruginosa NOT DETECTED NOTDET      Stenotrophomonas maltophilia by PCR NOT DETECTED NOTDET      Candida albicans by PCR NOT DETECTED NOTDET      Candida auris by PCR NOT DETECTED NOTDET      Candida glabrata NOT DETECTED NOTDET      Candida krusei by PCR NOT DETECTED NOTDET      Candida parapsilosis by PCR NOT DETECTED NOTDET      Candida tropicalis by PCR NOT DETECTED NOTDET      Cryptococcus neoformans/gattii by PCR NOT DETECTED NOTDET      Resistant gene targets          Methicillin Resistance mecA/C  by PCR Detected (A) NOTDET      [employer] test comment        False positive results may rarely occur.  Correlate with clinical,epidemiologic, and other laboratory findings   EKG 12 Lead    Collection Time: 12/04/22  6:29 PM Result Value Ref Range    Ventricular Rate 99 BPM    Atrial Rate 109 BPM    QRS Duration 103 ms    Q-T Interval 352 ms    QTc Calculation (Bazett) 452 ms    R Axis 100 degrees    T Axis -79 degrees    Diagnosis Atrial fibrillation    Culture, Blood 2    Collection Time: 12/04/22  6:36 PM    Specimen: Blood   Result Value Ref Range    Special Requests NO SPECIAL REQUESTS  LEFT  Antecubital        Culture NO GROWTH 2 DAYS     POCT Urinalysis no Micro    Collection Time: 12/04/22  6:50 PM   Result Value Ref Range    Specific Gravity, Urine, POC >1.030 (H) 1.001 - 1.023    pH, Urine, POC 5.5 5.0 - 9.0      Protein, Urine, POC TRACE (A) NEG mg/dL    Glucose, UA POC Negative NEG mg/dL    Ketones, Urine, POC Negative NEG mg/dL    Bilirubin, Urine, POC SMALL (A) NEG      Blood, UA POC Negative NEG      URINE UROBILINOGEN POC 1.0 0.2 - 1.0 EU/dL    Nitrate, Urine, POC Negative NEG      Leukocyte Est, UA POC Negative NEG      Performed by: Camilo Rebollar    Culture, Urine    Collection Time: 12/04/22  8:33 PM    Specimen: Urine    CATH URINE   Result Value Ref Range    Special Requests NO SPECIAL REQUESTS      Culture        10,000 to 50,000 COLONIES/mL MIXED SKIN ARNOL ISOLATED   Lactate, Sepsis    Collection Time: 12/04/22  8:38 PM   Result Value Ref Range    Lactic Acid, Sepsis 1.4 0.4 - 2.0 MMOL/L   Respiratory Panel, Molecular, with COVID-19 (Restricted: peds pts or suitable admitted adults)    Collection Time: 12/04/22 11:17 PM    Specimen: Nasopharyngeal   Result Value Ref Range    Adenovirus by PCR NOT DETECTED NOTDET      Coronavirus 229E by PCR NOT DETECTED NOTDET      Coronavirus HKU1 by PCR NOT DETECTED NOTDET      Coronavirus NL63 by PCR NOT DETECTED NOTDET      Coronavirus OC43 by PCR NOT DETECTED NOTDET      SARS-CoV-2, PCR NOT DETECTED NOTDET      Human Metapneumovirus by PCR NOT DETECTED NOTDET      Rhinovirus Enterovirus PCR NOT DETECTED NOTDET      Influenza A by PCR NOT DETECTED NOTDET      Influenza B PCR NOT DETECTED NOTDET      Parainfluenza 1 PCR NOT DETECTED NOTDET      Parainfluenza 2 PCR NOT DETECTED NOTDET      Parainfluenza 3 PCR NOT DETECTED NOTDET      Parainfluenza 4 PCR NOT DETECTED NOTDET      Respiratory Syncytial Virus by PCR NOT DETECTED NOTDET      Bordetella parapertussis by PCR NOT DETECTED NOTDET      Bordetella pertussis by PCR NOT DETECTED NOTDET      Chlamydophila Pneumonia PCR NOT DETECTED NOTDET      Mycoplasma pneumo by PCR NOT DETECTED NOTDET     CBC with Auto Differential    Collection Time: 12/06/22  4:40 AM   Result Value Ref Range    WBC 7.9 4.3 - 11.1 K/uL    RBC 3.34 (L) 4.23 - 5.6 M/uL    Hemoglobin 9.6 (L) 13.6 - 17.2 g/dL    Hematocrit 30.7 (L) 41.1 - 50.3 %    MCV 91.9 82.0 - 102.0 FL    MCH 28.7 26.1 - 32.9 PG    MCHC 31.3 (L) 31.4 - 35.0 g/dL    RDW 13.6 11.9 - 14.6 %    Platelets 041 287 - 305 K/uL    MPV 9.9 9.4 - 12.3 FL    nRBC 0.00 0.0 - 0.2 K/uL    Differential Type AUTOMATED      Seg Neutrophils 66 43 - 78 %    Lymphocytes 16 13 - 44 %    Monocytes 14 (H) 4.0 - 12.0 %    Eosinophils % 3 0.5 - 7.8 %    Basophils 1 0.0 - 2.0 %    Immature Granulocytes 0 0.0 - 5.0 %    Segs Absolute 5.2 1.7 - 8.2 K/UL    Absolute Lymph # 1.3 0.5 - 4.6 K/UL    Absolute Mono # 1.1 0.1 - 1.3 K/UL    Absolute Eos # 0.3 0.0 - 0.8 K/UL    Basophils Absolute 0.1 0.0 - 0.2 K/UL    Absolute Immature Granulocyte 0.0 0.0 - 0.5 K/UL   Basic Metabolic Panel w/ Reflex to MG    Collection Time: 12/06/22  4:40 AM   Result Value Ref Range    Sodium 143 133 - 143 mmol/L    Potassium 3.4 (L) 3.5 - 5.1 mmol/L    Chloride 113 (H) 101 - 110 mmol/L    CO2 25 21 - 32 mmol/L    Anion Gap 5 2 - 11 mmol/L    Glucose 90 65 - 100 mg/dL    BUN 17 8 - 23 MG/DL    Creatinine 0.74 (L) 0.8 - 1.5 MG/DL    Est, Glom Filt Rate >60 >60 ml/min/1.73m2    Calcium 8.0 (L) 8.3 - 10.4 MG/DL   Magnesium    Collection Time: 12/06/22  4:40 AM   Result Value Ref Range    Magnesium 2.1 1.8 - 2.4 mg/dL       I have personally reviewed imaging studies showing:   Other Studies:  No orders to display       Current Meds:  Current Facility-Administered Medications   Medication Dose Route Frequency    cefTRIAXone (ROCEPHIN) 1,000 mg in sodium chloride 0.9 % 50 mL IVPB mini-bag  1,000 mg IntraVENous Q24H    0.9 % sodium chloride infusion   IntraVENous Continuous    sodium chloride flush 0.9 % injection 5-40 mL  5-40 mL IntraVENous 2 times per day    sodium chloride flush 0.9 % injection 5-40 mL  5-40 mL IntraVENous PRN    0.9 % sodium chloride infusion   IntraVENous PRN    ondansetron (ZOFRAN-ODT) disintegrating tablet 4 mg  4 mg Oral Q8H PRN    Or    ondansetron (ZOFRAN) injection 4 mg  4 mg IntraVENous Q6H PRN    polyethylene glycol (GLYCOLAX) packet 17 g  17 g Oral Daily PRN    acetaminophen (TYLENOL) tablet 650 mg  650 mg Oral Q6H PRN    Or    acetaminophen (TYLENOL) suppository 650 mg  650 mg Rectal Q6H PRN    apixaban (ELIQUIS) tablet 5 mg  1 tablet Oral BID    metoprolol succinate (TOPROL XL) extended release tablet 25 mg  25 mg Oral Daily    tuberculin injection 5 Units  5 Units IntraDERmal Once    QUEtiapine (SEROQUEL) tablet 50 mg  50 mg Oral BID    melatonin tablet 3 mg  3 mg Oral Nightly PRN       Signed:  Cher Chance APRN - CNP    Notes, labs, VS, diagnostic testing reviewed  Case discussed with care team ,Dr. Jolie Freire

## 2022-12-06 NOTE — H&P
No interval changes from H*P in [facility] ED.  Transferred due to lack of beds

## 2022-12-06 NOTE — PROGRESS NOTES
[address] [facility] Pharmacokinetic Monitoring Service - Vancomycin     Otis Loza. is a 80 y.o. male starting on vancomycin therapy for blood stream infection. Pharmacy consulted by Gregorio Rosenthal NPfor monitoring and adjustment. Target Concentration: Goal AUC/BELLO 400-600 mg*hr/L    Additional Antimicrobials: ceftriaxone    Patient eligible for piperacillin-tazobactam to cefepime auto-substitution per P&T approved protocol? N/A    Pertinent Laboratory Values: Wt Readings from Last 1 Encounters:   12/06/22 162 lb (73.5 kg)     Temp Readings from Last 1 Encounters:   12/06/22 97.8 °F (36.6 °C) (Axillary)     Recent Labs     12/04/22  1827 12/06/22  0440   BUN 27* 17   CREATININE 1.10 0.74*   WBC 13.3* 7.9   PROCAL 0.06  --      Estimated Creatinine Clearance: 66 mL/min (A) (based on SCr of 0.74 mg/dL (L)). MRSA Nasal Swab: not ordered. Order placed by pharmacy. .    Plan:  Dosing recommendations based on Bayesian software  Start vancomycin 1500mg x 1 followed by 750mg q12h  Anticipated AUC of 506 and trough concentration of 16.9 at steady state  Renal labs as indicated   Vancomycin concentration ordered for 12/7 @ 1400    Pharmacy will continue to monitor patient and adjust therapy as indicated    Thank you for the consult,  Ruy Poe.  Harriet Westbrook, [facility]

## 2022-12-06 NOTE — PROGRESS NOTES
ACUTE OCCUPATIONAL THERAPY GOALS:   (Developed with and agreed upon by patient and/or caregiver.)  1. Patient will perform grooming with supervision. 2. Patient will perform upper body dressing with min assist.  3. Patient will perform lower body dressing with min assist.   4. Patient will perform bathing with supervision. 5. Patient will perform toilet transfer with contact guard assist.   6. Patient will perform ADL functional mobility and tranfers in room with contact guard assist.     Goals to be achieved in 7 days. OCCUPATIONAL THERAPY Initial Assessment and AM       OT Visit Days: 1  Acknowledge Orders  Time  OT Charge Capture  Rehab Caseload Tracker      Elin Gutiérrez is a 80 y.o. 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 Rich Chao admitted with above diagnosis. Pt confused and cannot follow commands. Pt is from [facility]. Pt independent prior to admission. Pt presents with decreased self care and functional mobility. Pt would benefit from skilled OT to increase independence. Today, pt worked on functional household ambulation in room. Pt voided on floor. Pt needed verbal cues and redirecting for task. Pt needed assist with changing gown and sponging cammie area. Pt left in recliner with breakfast tray setup and posey pad intact.       [address] [place] [address] [address] AM-PAC 6 Clicks Daily Activity Inpatient Short Form:    AM-PAC Daily Activity Inpatient   How much help for putting on and taking off regular lower body clothing?: A Lot  How much help for Bathing?: A Lot  How much help for Toileting?: A Lot  How much help for putting on and taking off regular upper body clothing?: A Lot  How much help for taking care of personal grooming?: A Lot  How much help for eating meals?: A Little  AM-[facility] Inpatient Daily Activity Raw Score: 13  AM-PAC Inpatient ADL T-Scale Score : 32.03  ADL Inpatient CMS 0-100% Score: 63.03  ADL Inpatient CMS G-Code Modifier : CL           SUBJECTIVE:     Mr. Adriano Lundberg unable to follow commands    Social/Functional Type of Home: Assisted living (memory care)  Home Layout: One level    OBJECTIVE:     LINES / Verlean [employer] / AIRWAY: IV    RESTRICTIONS/PRECAUTIONS:  Restrictions/Precautions: Fall Risk    PAIN: VITALS / O2:   Pre Treatment: 0/10         Post Treatment: none       Vitals          Oxygen            GROSS EVALUATION: INTACT IMPAIRED   (See Comments)   UE AROM [] []Decreased but functional    UE PROM [] []   Strength []  Decreased but functional      Posture / Balance [] Posture: Fair  Sitting - Static: Good  Sitting - Dynamic: Fair, +  Standing - Static: Fair, +  Standing - Dynamic: Fair   Sensation [x]     Coordination []  Decreased but functional      Tone [x]       Edema []    Activity Tolerance []  fair     Hand Dominance R [] L []      COGNITION/  PERCEPTION: INTACT IMPAIRED   (See Comments)   Orientation []  dementia   Vision []     Hearing []     Cognition  []  dementia   Perception []       MOBILITY: I Mod I S SBA CGA Min Mod Max Total  NT x2 Comments:   Bed Mobility    Rolling [] [] [] [] [] [] [] [] [] [] []    Supine to Sit [] [] [] [] [] [] [] [] [] [] []    Scooting [] [] [] [] [] [] [] [] [] [] []    Sit to Supine [] [] [] [] [] [] [] [] [] [] []    Transfers    Sit to Stand [] [] [] [] [x] [x] [] [] [] [] []    Bed to Chair [] [] [] [] [x] [x] [] [] [] [] []    Stand to Sit [] [] [] [] [x] [x] [] [] [] [] []    Tub/Shower [] [] [] [] [] [] [] [] [] [] []     Toilet [] [] [] [] [] [] [] [] [] [] []      [] [] [] [] [] [] [] [] [] [] []    I=Independent, Mod I=Modified Independent, S=Supervision/Setup, SBA=Standby Assistance, CGA=Contact Guard Assistance, Min=Minimal Assistance, Mod=Moderate Assistance, Max=Maximal Assistance, Total=Total Assistance, NT=Not Tested    ACTIVITIES OF DAILY LIVING: I Mod I S SBA CGA Min Mod Max Total NT Comments   BASIC ADLs:              Upper Body Bathing  [] [] [] [] [] [] [] [] [] []    Lower Body Bathing [] [] [] [] [] [] [] [] [x] []    Toileting [] [] [] [] [] [] [] [] [x] []    Upper Body Dressing [] [] [] [] [] [x] [] [] [] []    Lower Body Dressing [] [] [] [] [] [] [] [x] [] []    Feeding [] [] [x] [] [] [] [] [] [] []    Grooming [] [] [] [] [] [x] [] [] [] []    Personal Device Care [] [] [] [] [] [] [] [] [] []    Functional Mobility [] [] [] [] [x] [x] [] [] [] []    I=Independent, Mod I=Modified Independent, S=Supervision/Setup, SBA=Standby Assistance, CGA=Contact Guard Assistance, Min=Minimal Assistance, Mod=Moderate Assistance, Max=Maximal Assistance, Total=Total Assistance, NT=Not Tested    PLAN:   FREQUENCY/DURATION   OT Plan of Care: 3 times/week for duration of hospital stay or until stated goals are met, whichever comes first.    PROBLEM LIST:   (Skilled intervention is medically necessary to address:)  Decreased ADL/Functional Activities  Decreased Activity Tolerance  Decreased Balance  Decreased Strength  Decreased Transfer Abilities   INTERVENTIONS PLANNED:  (Benefits and precautions of occupational therapy have been discussed with the patient.)  Self Care Training  Therapeutic Activity  Therapeutic Exercise/HEP  Neuromuscular Re-education  Manual Therapy  Education         TREATMENT:     EVALUATION: LOW COMPLEXITY: (Untimed Charge)    TREATMENT:   Co-Treatment PT/OT necessary due to patient's decreased overall endurance/tolerance levels, as well as need for high level skilled assistance to complete functional transfers/mobility and functional tasks  Self Care (15 minutes): Patient participated in lower body bathing, toileting, upper body dressing, lower body dressing, and self feeding ADLs in unsupported sitting and standing with maximal visual, verbal, and manual cueing to increase independence and increase activity tolerance. Patient also participated in functional mobility, functional transfer, and adaptive equipment training to increase independence and increase activity tolerance.      TREATMENT GRID:  N/A    AFTER TREATMENT PRECAUTIONS: Alarm Activated, Bed/Chair Locked, Call light within reach, Chair, Needs within reach, and RN notified    INTERDISCIPLINARY COLLABORATION:  RN/ PCT, PT/ PTA, and OT/ MAYORGA    EDUCATION:  Education Given To: Patient  Education Provided: Role of Therapy  Education Method: Demonstration  Barriers to Learning: Cognition  Education Outcome: Verbalized understanding;Continued education needed    TOTAL TREATMENT DURATION AND TIME:  Time In: 0730  Time Out: 2766  Minutes: 2700 [facility],

## 2022-12-06 NOTE — PROGRESS NOTES
Echo attempted at 1515, however patient sitting up in bed, confused, taking off socks. Unable to be redirected to lay down for exam. Spoke with RN, will coordinate in AM to try to complete echo.

## 2022-12-06 NOTE — PROGRESS NOTES
Physician Progress Note      PATIENT:               Gallito Duque  CSN #:                  [id number]  :                       1932  ADMIT DATE:       2022 3:18 AM  DISCH DATE:  RESPONDING  PROVIDER #:        Ofelia Samuels DO          QUERY TEXT:    Patient admitted with sepsis, noted to have atrial fibrillation and is   maintained on eliquis. If possible, please document in progress notes and   discharge summary if you are evaluating and/or treating any of the following: The medical record reflects the following:  Risk Factors: 80 y.o. male with medical history of vascular dementia, AFIB on   apixaban who presented from memory clinic with fever and AMS. Clinical Indicators: A Fib  Treatment: Eliquis    Thank you,  Ellen Anne RN, BSN, CDI  [email or web address]  . 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 Query created by: Raul Kingsley on 2022 10:52 AM      Electronically signed by:   Yeny Hwang DO 2022 10:58 AM

## 2022-12-06 NOTE — PROGRESS NOTES
Pt dry, no void noted. Multiple attempts to use urinal, pt unable to follow command. Bladder scanned for 356cc. Two assist stand to use urinal, pt successful with 325 cc void.

## 2022-12-06 NOTE — CARE COORDINATION
Interdisciplinary team meeting with attending, CM, nursing, PT and nutritional services for plan of care Patient not medically stable ID consulted. Noted PT/OT recommend return to memory care with home health therapy. CM will need to f/u with Memory Care at [address] to see they require certain home health and also if needs flu/covid test prior to return. CM following.

## 2022-12-06 NOTE — PROGRESS NOTES
ACUTE PHYSICAL THERAPY GOALS:   (Developed with and agreed upon by patient and/or caregiver.)  STG:  (1.)Mr. Marcos Birmingham will move from supine to sit and sit to supine  with STAND BY ASSIST within 4-7 treatment day(s). (2.)Mr. Marcos Birmingham will transfer from bed to chair and chair to bed with STAND BY ASSIST using the least restrictive device within 4-7 treatment day(s). (3.)Mr. Jonas will ambulate with STAND BY ASSIST for 100 feet with the least restrictive device within 4-7 treatment day(s). ________________________________________________________________________________________________     PHYSICAL THERAPY Initial Assessment and AM  (Link to Caseload Tracking: PT Visit Days : 1  Acknowledge Orders  Time In/Out  PT Charge Capture  Rehab Caseload Tracker    Delmis Orona Sr. is a 80 y.o. 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 Marcos Birmingham is admitted with above diagnosis. He is from an [facility] memory care, per SW note he was independent there and would rearrange the furniture. He presents with generalized weakness and decreased functional mobility. He will benefit from PT interventions to help increase his activity level to baseline and maximize his independence with functional mobility. Feel pt would be moving much better in his own environment. Today worked on sit to stand and walking in room.  Pt was looking for the restroom but was difficulty to redirect him and he ended up voiding before he got to the restroom. Standing balance for gown change and sponging off. He stayed up in recliner with alarm on and RN at bedside to assist him with breakfast. Hope to be able to progress at next visit, his dementia may be a barrier to his therapy. [address] [facility] [address] [id number] AM-PAC 6 Clicks Basic Mobility Inpatient Short Form  AM-PAC Mobility Inpatient   How much difficulty turning over in bed?: A Little  How much difficulty sitting down on / standing up from a chair with arms?: A Little  How much difficulty moving from lying on back to sitting on side of bed?: A Little  How much help from another person moving to and from a bed to a chair?: A Little  How much help from another person needed to walk in hospital room?: A Little  How much help from another person for climbing 3-5 steps with a railing?: A Little  AM-[facility] Inpatient Mobility Raw Score : 18  AM-PAC Inpatient T-Scale Score : 43.63  Mobility Inpatient CMS 0-100% Score: 46.58  Mobility Inpatient CMS G-Code Modifier : CK    SUBJECTIVE:   Mr. oDni Jennings was talking but not making a lot of sense.      Social/Functional Type of Home: Assisted living (memory care)  Home Layout: One level    OBJECTIVE:     PAIN: VITALS / O2: PRECAUTION / Kelly Edisto / DRAINS:   Pre Treatment: pt not stating any pain         Post Treatment: pt not stating any pain Vitals        Oxygen      IV    RESTRICTIONS/PRECAUTIONS:                    GROSS EVALUATION: Intact Impaired (Comments):   AROM []  Generally decreased, functional   PROM []    Strength []  Generally decreased, functional   Balance [] Posture: Fair  Sitting - Static: Good  Sitting - Dynamic: Fair, +  Standing - Static: Fair, +  Standing - Dynamic: Fair   Posture [] Forward Head  Rounded Shoulders   Sensation []     Coordination []   Generally decreased   Tone []     Edema []    Activity Tolerance []  fait    []      COGNITION/  PERCEPTION: Intact Impaired (Comments):   Orientation []  dementia Vision []     Hearing []     Cognition  []  dementia     MOBILITY: I Mod I S SBA CGA Min Mod Max Total  NT x2 Comments:   Bed Mobility    Rolling [] [] [] [] [] [] [] [] [] [] []    Supine to Sit [] [] [] [] [] [] [] [] [] [] []    Scooting [] [] [] [] [] [] [] [] [] [] []    Sit to Supine [] [] [] [] [] [] [] [] [] [] []    Transfers    Sit to Stand [] [] [] [] [x] [x] [] [] [] [] []    Bed to Chair [] [] [] [] [x] [x] [] [] [] [] []    Stand to Sit [] [] [] [] [x] [x] [] [] [] [] []     [] [] [] [] [] [] [] [] [] [] []    I=Independent, Mod I=Modified Independent, S=Supervision, SBA=Standby Assistance, CGA=Contact Guard Assistance,   Min=Minimal Assistance, Mod=Moderate Assistance, Max=Maximal Assistance, Total=Total Assistance, NT=Not Tested    GAIT: I Mod I S SBA CGA Min Mod Max Total  NT x2 Comments:   Level of Assistance [] [] [] [] [x] [x] [] [] [] [] [] Pt needed direction, dementia and confused   Distance 20 feet    DME Hand held assist    Gait Quality Decreased boyd , Decreased step clearance, Decreased step length, and Shuffling     Weightbearing Status      Stairs      I=Independent, Mod I=Modified Independent, S=Supervision, SBA=Standby Assistance, CGA=Contact Guard Assistance,   Min=Minimal Assistance, Mod=Moderate Assistance, Max=Maximal Assistance, Total=Total Assistance, NT=Not Tested    PLAN:   FREQUENCY AND DURATION: Daily for duration of hospital stay or until stated goals are met, whichever comes first.    THERAPY PROGNOSIS: Fair due to dementia    PROBLEM LIST:   (Skilled intervention is medically necessary to address:)  Decreased ADL/Functional Activities  Decreased Activity Tolerance  Decreased AROM/PROM  Decreased Balance  Decreased Cognition  Decreased Coordination  Decreased Gait Ability  Decreased Strength  Decreased Transfer Abilities INTERVENTIONS PLANNED:   (Benefits and precautions of physical therapy have been discussed with the patient.)  Therapeutic Activity  Therapeutic Exercise/HEP  Gait Training       TREATMENT:   EVALUATION: LOW COMPLEXITY: (Untimed Charge)    TREATMENT:   Co-Treatment PT/OT necessary due to patient's decreased overall endurance/tolerance levels, as well as need for high level skilled assistance to complete functional transfers/mobility and functional tasks  Therapeutic Activity (10 Minutes): Therapeutic activity included Transfer Training, Ambulation on level ground, Sitting balance , and Standing balance to improve functional Activity tolerance, Balance, Mobility, and Strength.     TREATMENT GRID:  N/A    AFTER TREATMENT PRECAUTIONS: Alarm Activated, Bed/Chair Locked, Call light within reach, Chair, Needs within reach, and RN at bedside    INTERDISCIPLINARY COLLABORATION:  RN/ PCT and OT/ MAYORGA    EDUCATION: Education Given To: Patient  Education Outcome: Continued education needed    TIME IN/OUT:  Time In: 0730  Time Out: [address]  Minutes: Philip Dodge PT

## 2022-12-07 ENCOUNTER — APPOINTMENT (OUTPATIENT)
Dept: NON INVASIVE DIAGNOSTICS | Age: 87
DRG: 871 | End: 2022-12-07
Attending: FAMILY MEDICINE
Payer: MEDICARE

## 2022-12-07 LAB
ANION GAP SERPL CALC-SCNC: 6 MMOL/L (ref 2–11)
BACTERIA SPEC CULT: ABNORMAL
BACTERIA SPEC CULT: ABNORMAL
BACTERIA SPEC CULT: NORMAL
BASOPHILS # BLD: 0.1 K/UL (ref 0–0.2)
BASOPHILS NFR BLD: 1 % (ref 0–2)
BUN SERPL-MCNC: 12 MG/DL (ref 8–23)
CALCIUM SERPL-MCNC: 8.2 MG/DL (ref 8.3–10.4)
CHLORIDE SERPL-SCNC: 112 MMOL/L (ref 101–110)
CO2 SERPL-SCNC: 24 MMOL/L (ref 21–32)
CREAT SERPL-MCNC: 0.82 MG/DL (ref 0.8–1.5)
DIFFERENTIAL METHOD BLD: ABNORMAL
ECHO AO ASC DIAM: 3.5 CM
ECHO AO ASCENDING AORTA INDEX: 1.85 CM/M2
ECHO AO ROOT DIAM: 3.7 CM
ECHO AO ROOT INDEX: 1.96 CM/M2
ECHO AV AREA PEAK VELOCITY: 2.2 CM2
ECHO AV AREA VTI: 2.3 CM2
ECHO AV AREA/BSA PEAK VELOCITY: 1.2 CM2/M2
ECHO AV AREA/BSA VTI: 1.2 CM2/M2
ECHO AV MEAN GRADIENT: 5 MMHG
ECHO AV MEAN VELOCITY: 1 M/S
ECHO AV PEAK GRADIENT: 9 MMHG
ECHO AV PEAK VELOCITY: 1.5 M/S
ECHO AV VELOCITY RATIO: 0.67
ECHO AV VTI: 26.9 CM
ECHO BSA: 1.89 M2
ECHO EST RA PRESSURE: 8 MMHG
ECHO IVC EXP: 2.3 CM
ECHO LA AREA 2C: 19.5 CM2
ECHO LA AREA 4C: 27.4 CM2
ECHO LA DIAMETER INDEX: 2.49 CM/M2
ECHO LA DIAMETER: 4.7 CM
ECHO LA MAJOR AXIS: 6.4 CM
ECHO LA MINOR AXIS: 4.8 CM
ECHO LA TO AORTIC ROOT RATIO: 1.27
ECHO LA VOL 2C: 64 ML (ref 18–58)
ECHO LA VOL 4C: 90 ML (ref 18–58)
ECHO LA VOLUME INDEX A2C: 34 ML/M2 (ref 16–34)
ECHO LA VOLUME INDEX A4C: 48 ML/M2 (ref 16–34)
ECHO LV E' LATERAL VELOCITY: 17 CM/S
ECHO LV E' SEPTAL VELOCITY: 10 CM/S
ECHO LV EDV A2C: 81 ML
ECHO LV EDV A4C: 78 ML
ECHO LV EDV INDEX A4C: 41 ML/M2
ECHO LV EDV NDEX A2C: 43 ML/M2
ECHO LV EJECTION FRACTION A2C: 66 %
ECHO LV EJECTION FRACTION A4C: 62 %
ECHO LV EJECTION FRACTION BIPLANE: 64 % (ref 55–100)
ECHO LV ESV A2C: 28 ML
ECHO LV ESV A4C: 30 ML
ECHO LV ESV INDEX A2C: 15 ML/M2
ECHO LV ESV INDEX A4C: 16 ML/M2
ECHO LV FRACTIONAL SHORTENING: 26 % (ref 28–44)
ECHO LV INTERNAL DIMENSION DIASTOLE INDEX: 2.22 CM/M2
ECHO LV INTERNAL DIMENSION DIASTOLIC: 4.2 CM (ref 4.2–5.9)
ECHO LV INTERNAL DIMENSION SYSTOLIC INDEX: 1.64 CM/M2
ECHO LV INTERNAL DIMENSION SYSTOLIC: 3.1 CM
ECHO LV IVSD: 1.2 CM (ref 0.6–1)
ECHO LV MASS 2D: 178.2 G (ref 88–224)
ECHO LV MASS INDEX 2D: 94.3 G/M2 (ref 49–115)
ECHO LV POSTERIOR WALL DIASTOLIC: 1.2 CM (ref 0.6–1)
ECHO LV RELATIVE WALL THICKNESS RATIO: 0.57
ECHO LVOT AREA: 3.5 CM2
ECHO LVOT AV VTI INDEX: 0.65
ECHO LVOT DIAM: 2.1 CM
ECHO LVOT MEAN GRADIENT: 2 MMHG
ECHO LVOT PEAK GRADIENT: 4 MMHG
ECHO LVOT PEAK VELOCITY: 1 M/S
ECHO LVOT STROKE VOLUME INDEX: 32.1 ML/M2
ECHO LVOT SV: 60.6 ML
ECHO LVOT VTI: 17.5 CM
ECHO MV E DECELERATION TIME (DT): 187 MS
ECHO MV E VELOCITY: 1.26 M/S
ECHO MV E/E' LATERAL: 7.41
ECHO MV E/E' RATIO (AVERAGED): 10.01
ECHO MV E/E' SEPTAL: 12.6
ECHO MV EROA PISA: 8.7 CM2
ECHO MV REGURGITANT ALIASING (NYQUIST) VELOCITY: 39 CM/S
ECHO MV REGURGITANT PEAK GRADIENT: 81 MMHG
ECHO MV REGURGITANT PEAK VELOCITY: 4.5 M/S
ECHO MV REGURGITANT RADIUS PISA: 0.4 CM
ECHO MV REGURGITANT VOLUME PISA: 1010.16 ML
ECHO MV REGURGITANT VTIA: 116 CM
ECHO RIGHT VENTRICULAR SYSTOLIC PRESSURE (RVSP): 47 MMHG
ECHO RV BASAL DIMENSION: 4.6 CM
ECHO RV FREE WALL PEAK S': 10 CM/S
ECHO RV MID DIMENSION: 3 CM
ECHO RV TAPSE: 1.8 CM (ref 1.7–?)
ECHO TV REGURGITANT MAX VELOCITY: 3.11 M/S
ECHO TV REGURGITANT PEAK GRADIENT: 39 MMHG
EOSINOPHIL # BLD: 0.1 K/UL (ref 0–0.8)
EOSINOPHIL NFR BLD: 1 % (ref 0.5–7.8)
ERYTHROCYTE [DISTWIDTH] IN BLOOD BY AUTOMATED COUNT: 13.4 % (ref 11.9–14.6)
GLUCOSE SERPL-MCNC: 113 MG/DL (ref 65–100)
GRAM STN SPEC: ABNORMAL
HCT VFR BLD AUTO: 30.7 % (ref 41.1–50.3)
HGB BLD-MCNC: 9.6 G/DL (ref 13.6–17.2)
IMM GRANULOCYTES # BLD AUTO: 0 K/UL (ref 0–0.5)
IMM GRANULOCYTES NFR BLD AUTO: 0 % (ref 0–5)
LYMPHOCYTES # BLD: 1 K/UL (ref 0.5–4.6)
LYMPHOCYTES NFR BLD: 12 % (ref 13–44)
MAGNESIUM SERPL-MCNC: 2 MG/DL (ref 1.8–2.4)
MCH RBC QN AUTO: 28.7 PG (ref 26.1–32.9)
MCHC RBC AUTO-ENTMCNC: 31.3 G/DL (ref 31.4–35)
MCV RBC AUTO: 91.9 FL (ref 82–102)
MONOCYTES # BLD: 1 K/UL (ref 0.1–1.3)
MONOCYTES NFR BLD: 13 % (ref 4–12)
NEUTS SEG # BLD: 5.7 K/UL (ref 1.7–8.2)
NEUTS SEG NFR BLD: 73 % (ref 43–78)
NRBC # BLD: 0 K/UL (ref 0–0.2)
PLATELET # BLD AUTO: 189 K/UL (ref 150–450)
PMV BLD AUTO: 10 FL (ref 9.4–12.3)
POTASSIUM SERPL-SCNC: 3.5 MMOL/L (ref 3.5–5.1)
RBC # BLD AUTO: 3.34 M/UL (ref 4.23–5.6)
SERVICE CMNT-IMP: ABNORMAL
SERVICE CMNT-IMP: NORMAL
SODIUM SERPL-SCNC: 142 MMOL/L (ref 133–143)
WBC # BLD AUTO: 7.8 K/UL (ref 4.3–11.1)

## 2022-12-07 PROCEDURE — 2580000003 HC RX 258: Performed by: NURSE PRACTITIONER

## 2022-12-07 PROCEDURE — 80048 BASIC METABOLIC PNL TOTAL CA: CPT

## 2022-12-07 PROCEDURE — 6370000000 HC RX 637 (ALT 250 FOR IP): Performed by: NURSE PRACTITIONER

## 2022-12-07 PROCEDURE — 93306 TTE W/DOPPLER COMPLETE: CPT | Performed by: INTERNAL MEDICINE

## 2022-12-07 PROCEDURE — 93306 TTE W/DOPPLER COMPLETE: CPT

## 2022-12-07 PROCEDURE — 6360000002 HC RX W HCPCS: Performed by: HOSPITALIST

## 2022-12-07 PROCEDURE — 36415 COLL VENOUS BLD VENIPUNCTURE: CPT

## 2022-12-07 PROCEDURE — 2580000003 HC RX 258: Performed by: HOSPITALIST

## 2022-12-07 PROCEDURE — 85025 COMPLETE CBC W/AUTO DIFF WBC: CPT

## 2022-12-07 PROCEDURE — 83735 ASSAY OF MAGNESIUM: CPT

## 2022-12-07 PROCEDURE — 1100000003 HC PRIVATE W/ TELEMETRY

## 2022-12-07 RX ADMIN — SODIUM CHLORIDE, PRESERVATIVE FREE 10 ML: 5 INJECTION INTRAVENOUS at 13:14

## 2022-12-07 RX ADMIN — SODIUM CHLORIDE, PRESERVATIVE FREE 10 ML: 5 INJECTION INTRAVENOUS at 21:27

## 2022-12-07 RX ADMIN — APIXABAN 5 MG: 5 TABLET, FILM COATED ORAL at 21:28

## 2022-12-07 RX ADMIN — METOPROLOL SUCCINATE 25 MG: 25 TABLET, EXTENDED RELEASE ORAL at 08:16

## 2022-12-07 RX ADMIN — QUETIAPINE FUMARATE 50 MG: 25 TABLET ORAL at 08:15

## 2022-12-07 RX ADMIN — CEFTRIAXONE 1000 MG: 1 INJECTION, POWDER, FOR SOLUTION INTRAMUSCULAR; INTRAVENOUS at 18:38

## 2022-12-07 RX ADMIN — APIXABAN 5 MG: 5 TABLET, FILM COATED ORAL at 08:16

## 2022-12-07 ASSESSMENT — PAIN SCALES - GENERAL
PAINLEVEL_OUTOF10: 0

## 2022-12-07 ASSESSMENT — PAIN SCALES - PAIN ASSESSMENT IN ADVANCED DEMENTIA (PAINAD)
NEGVOCALIZATION: 0
FACIALEXPRESSION: 0
BREATHING: 0
BODYLANGUAGE: 0
CONSOLABILITY: 1
TOTALSCORE: 1

## 2022-12-07 NOTE — PROGRESS NOTES
Physical Therapy Note:    Attempted to see patient this AM for physical therapy treatment  session. Patient is sleeping and difficult to arouse. Attempted multiple times to awaken patient but he was unable to stay alert, therefore not appropriate for therapy at this time. Will follow and re-attempt as schedule permits/patient available.  Thank you,    Catina Mccain, PT     Rehab Caseload Tracker

## 2022-12-07 NOTE — PROGRESS NOTES
Hospitalist Progress Note   Admit Date:  2022  3:18 AM   Name:  Niki Brandt Sr.   Age:  80 y.o. Sex:  male  :  1932   MRN:  [id number]   Room:  [facility]/    Presenting Complaint: No chief complaint on file. 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 male with medical history of vascular dementia, AFIB on apixaban admitted  for acute encephalopathy related to UTI. He met sepsis criteria. Started on Rocephin. . Rapid covid/influenza negative. Subjective & 24hr Events (22): Patient was seen and examined at the bedside. Patient did not follow commands very well. Still some mild confusion. Assessment & Plan:   Acute encephalopathy  -Patient with urinary tract infection plus 1 out of 2 blood cultures for MRSA  - Continue to follow-up repeat blood culture  - Infectious disease consulted, appreciate recommendations  - Complete 5 days Rocephin per infectious disease  - Echo with ejection fraction of 55 to 60% with abnormal diastolic dysfunction    Sepsis  - Likely secondary to urinary tract infection  - Continue to monitor blood cultures  - Continue antibiotics    Atrial fibrillation  - Continue home beta-blocker and Eliquis  - Continue to monitor heart rate    Vascular dementia  - Continue home Seroquel  - Melatonin for sleep hygiene  - Patient higher fall risk/flight risk  - Sitter at bedside    Essential hypertension  - Continue home beta-blocker  - Blood pressure currently controlled      Anticipated discharge needs:    Dispo pending clinical course. PT/OT return to memory care. Patient will need to finish IV antibiotics. Patient likely be discharged next 48 hours.     Diet:  ADULT DIET; Easy to Chew  DVT PPx: Eliquis  Code status: DNR    Hospital Problems:  Principal Problem:    Acute encephalopathy  Resolved Problems:    * No resolved hospital problems. *      Objective:   Patient Vitals for the past 24 hrs:   Temp Pulse Resp BP SpO2   12/07/22 1115 99.3 °F (37.4 °C) (!) 110 16 109/77 --   12/07/22 0830 -- -- -- -- 96 %   12/07/22 0730 98.1 °F (36.7 °C) (!) 103 12 115/80 (!) 86 %   12/07/22 0000 -- (!) 108 18 123/78 95 %   12/06/22 2141 98.1 °F (36.7 °C) (!) 114 20 (!) 155/73 93 %       Oxygen Therapy  SpO2: 96 % (on ear)  O2 Device: None (Room air)    Estimated body mass index is 23.92 kg/m² as calculated from the following:    Height as of this encounter: 5' 9\" (1.753 m). Weight as of this encounter: 162 lb (73.5 kg). Intake/Output Summary (Last 24 hours) at 12/7/2022 1357  Last data filed at 12/7/2022 0830  Gross per 24 hour   Intake 270 ml   Output --   Net 270 ml         Physical Exam:   Blood pressure 109/77, pulse (!) 110, temperature 99.3 °F (37.4 °C), temperature source Oral, resp. rate 16, height 5' 9\" (1.753 m), weight 162 lb (73.5 kg), SpO2 96 %. General:    Well nourished. Head:  Normocephalic, atraumatic  Eyes:  Sclerae appear normal.  Pupils equally round. ENT:  Nares appear normal, no drainage. Moist oral mucosa  Neck:  No restricted ROM. Trachea midline   CV:   IRR. No m/r/g. No jugular venous distension. Lungs:   CTAB. No wheezing, rhonchi, or rales. Symmetric expansion. Abdomen: Bowel sounds present. Soft, nontender, nondistended. Extremities: No cyanosis or clubbing. No edema  Skin:     No rashes and normal coloration. Warm and dry. Neuro:  CN II-XII grossly intact. Sensation intact. Confused, does not follow commands well. Psych:  Normal mood and affect.       I have personally reviewed labs and tests showing:  Recent Labs:  Recent Results (from the past 48 hour(s))   CBC with Auto Differential    Collection Time: 12/06/22  4:40 AM   Result Value Ref Range    WBC 7.9 4.3 - 11.1 K/uL    RBC 3.34 (L) 4.23 - 5.6 M/uL    Hemoglobin 9.6 (L) 13.6 - 17.2 g/dL    Hematocrit 30.7 (L) 41.1 - 50.3 %    MCV 91.9 82.0 - 102.0 FL    MCH 28.7 26.1 - 32.9 PG    MCHC 31.3 (L) 31.4 - 35.0 g/dL    RDW 13.6 11.9 - 14.6 %    Platelets 688 161 - 175 K/uL    MPV 9.9 9.4 - 12.3 FL    nRBC 0.00 0.0 - 0.2 K/uL    Differential Type AUTOMATED      Seg Neutrophils 66 43 - 78 %    Lymphocytes 16 13 - 44 %    Monocytes 14 (H) 4.0 - 12.0 %    Eosinophils % 3 0.5 - 7.8 %    Basophils 1 0.0 - 2.0 %    Immature Granulocytes 0 0.0 - 5.0 %    Segs Absolute 5.2 1.7 - 8.2 K/UL    Absolute Lymph # 1.3 0.5 - 4.6 K/UL    Absolute Mono # 1.1 0.1 - 1.3 K/UL    Absolute Eos # 0.3 0.0 - 0.8 K/UL    Basophils Absolute 0.1 0.0 - 0.2 K/UL    Absolute Immature Granulocyte 0.0 0.0 - 0.5 K/UL   Basic Metabolic Panel w/ Reflex to MG    Collection Time: 12/06/22  4:40 AM   Result Value Ref Range    Sodium 143 133 - 143 mmol/L    Potassium 3.4 (L) 3.5 - 5.1 mmol/L    Chloride 113 (H) 101 - 110 mmol/L    CO2 25 21 - 32 mmol/L    Anion Gap 5 2 - 11 mmol/L    Glucose 90 65 - 100 mg/dL    BUN 17 8 - 23 MG/DL    Creatinine 0.74 (L) 0.8 - 1.5 MG/DL    Est, Glom Filt Rate >60 >60 ml/min/1.73m2    Calcium 8.0 (L) 8.3 - 10.4 MG/DL   Magnesium    Collection Time: 12/06/22  4:40 AM   Result Value Ref Range    Magnesium 2.1 1.8 - 2.4 mg/dL   Culture, Blood 1    Collection Time: 12/06/22 11:55 AM    Specimen: Blood   Result Value Ref Range    Special Requests RIGHT  Antecubital        Culture NO GROWTH AFTER 19 HOURS     Culture, Blood 1    Collection Time: 12/06/22 11:56 AM    Specimen: Blood   Result Value Ref Range    Special Requests LEFT  Antecubital        Culture NO GROWTH AFTER 19 HOURS     MSSA/MRSA Screen BY PCR    Collection Time: 12/06/22 12:36 PM    Specimen: Nares; Swab   Result Value Ref Range    Special Requests NO SPECIAL REQUESTS      Culture        SA target not detected. A MRSA NEGATIVE, SA NEGATIVE test result does not preclude MRSA or SA nasal colonization.    CBC with Auto Differential    Collection Time: 12/07/22  4:05 AM Result Value Ref Range    WBC 7.8 4.3 - 11.1 K/uL    RBC 3.34 (L) 4.23 - 5.6 M/uL    Hemoglobin 9.6 (L) 13.6 - 17.2 g/dL    Hematocrit 30.7 (L) 41.1 - 50.3 %    MCV 91.9 82.0 - 102.0 FL    MCH 28.7 26.1 - 32.9 PG    MCHC 31.3 (L) 31.4 - 35.0 g/dL    RDW 13.4 11.9 - 14.6 %    Platelets 488 700 - 426 K/uL    MPV 10.0 9.4 - 12.3 FL    nRBC 0.00 0.0 - 0.2 K/uL    Differential Type AUTOMATED      Seg Neutrophils 73 43 - 78 %    Lymphocytes 12 (L) 13 - 44 %    Monocytes 13 (H) 4.0 - 12.0 %    Eosinophils % 1 0.5 - 7.8 %    Basophils 1 0.0 - 2.0 %    Immature Granulocytes 0 0.0 - 5.0 %    Segs Absolute 5.7 1.7 - 8.2 K/UL    Absolute Lymph # 1.0 0.5 - 4.6 K/UL    Absolute Mono # 1.0 0.1 - 1.3 K/UL    Absolute Eos # 0.1 0.0 - 0.8 K/UL    Basophils Absolute 0.1 0.0 - 0.2 K/UL    Absolute Immature Granulocyte 0.0 0.0 - 0.5 K/UL   Basic Metabolic Panel w/ Reflex to MG    Collection Time: 12/07/22  4:05 AM   Result Value Ref Range    Sodium 142 133 - 143 mmol/L    Potassium 3.5 3.5 - 5.1 mmol/L    Chloride 112 (H) 101 - 110 mmol/L    CO2 24 21 - 32 mmol/L    Anion Gap 6 2 - 11 mmol/L    Glucose 113 (H) 65 - 100 mg/dL    BUN 12 8 - 23 MG/DL    Creatinine 0.82 0.8 - 1.5 MG/DL    Est, Glom Filt Rate >60 >60 ml/min/1.73m2    Calcium 8.2 (L) 8.3 - 10.4 MG/DL   Magnesium    Collection Time: 12/07/22  4:05 AM   Result Value Ref Range    Magnesium 2.0 1.8 - 2.4 mg/dL   Transthoracic echocardiogram (TTE) complete with contrast, bubble, strain, and 3D PRN    Collection Time: 12/07/22  9:30 AM   Result Value Ref Range    LV EDV A2C 81 mL    LV EDV A4C 78 mL    LV ESV A2C 28 mL    LV ESV A4C 30 mL    IVSd 1.2 (A) 0.6 - 1.0 cm    LVIDd 4.2 4.2 - 5.9 cm    LVIDs 3.1 cm    LVOT Diameter 2.1 cm    LVOT Mean Gradient 2 mmHg    LVOT VTI 17.5 cm    LVOT Peak Velocity 1.0 m/s    LVOT Peak Gradient 4 mmHg    LVPWd 1.2 (A) 0.6 - 1.0 cm    LV E' Lateral Velocity 17 cm/s    LV E' Septal Velocity 10 cm/s    LV Ejection Fraction A2C 66 %    LV Ejection Fraction A4C 62 %    EF BP 64 55 - 100 %    LVOT Area 3.5 cm2    LVOT SV 60.6 ml    LA Minor Axis 4.8 cm    LA Major Axis 6.4 cm    LA Area 2C 19.5 cm2    LA Area 4C 27.4 cm2    LA Volume 2C 64 (A) 18 - 58 mL    LA Volume 4C 90 (A) 18 - 58 mL    LA Diameter 4.7 cm    AV Mean Gradient 5 mmHg    AV VTI 26.9 cm    AV Mean Velocity 1.0 m/s    AV Peak Velocity 1.5 m/s    AV Peak Gradient 9 mmHg    AV Area by VTI 2.3 cm2    AV Area by Peak Velocity 2.2 cm2    Aortic Root 3.7 cm    Ascending Aorta 3.5 cm    IVC Expiration 2.3 cm    MV Nyquist Velocity 39 cm/s    MR Radius PISA 0.40 cm    MR .0 cm    MR Peak Velocity 4.5 m/s    MR Peak Gradient 81 mmHg    MV E Wave Deceleration Time 187.0 ms    MV E Velocity 1.26 m/s    MV EROA PISA 8.7 cm2    RV Basal Dimension 4.6 cm    RV Mid Dimension 3.0 cm    RV Free Wall Peak S' 10 cm/s    TAPSE 1.8 1.7 cm    TR Max Velocity 3.11 m/s    TR Peak Gradient 39 mmHg    Body Surface Area 1.89 m2    Fractional Shortening 2D 26 28 - 44 %    LV ESV Index A4C 16 mL/m2    LV EDV Index A4C 41 mL/m2    LV ESV Index A2C 15 mL/m2    LV EDV Index A2C 43 mL/m2    LVIDd Index 2.22 cm/m2    LVIDs Index 1.64 cm/m2    LV RWT Ratio 0.57     LV Mass 2D 178.2 88 - 224 g    LV Mass 2D Index 94.3 49 - 115 g/m2    E/E' Ratio (Averaged) 10.01     E/E' Lateral 7.41     E/E' Septal 12.60     LVOT Stroke Volume Index 32.1 mL/m2    LA Volume Index 2C 34 16 - 34 mL/m2    LA Volume Index 4C 48 (A) 16 - 34 mL/m2    LA Size Index 2.49 cm/m2    LA/AO Root Ratio 1.27     Ao Root Index 1.96 cm/m2    Ascending Aorta Index 1.85 cm/m2    AV Velocity Ratio 0.67     LVOT:AV VTI Index 0.65     LOREN/BSA VTI 1.2 cm2/m2    LOREN/BSA Peak Velocity 1.2 cm2/m2    MR Regurg Volume PISA 1,010. 16 mL    Est. RA Pressure 8 mmHg    RVSP 47 mmHg       I have personally reviewed imaging studies showing:   Other Studies:  No orders to display       Current Meds:  Current Facility-Administered Medications   Medication Dose Route Frequency cefTRIAXone (ROCEPHIN) 1,000 mg in sodium chloride 0.9 % 50 mL IVPB mini-bag  1,000 mg IntraVENous Q24H    0.9 % sodium chloride infusion   IntraVENous Continuous    sodium chloride flush 0.9 % injection 5-40 mL  5-40 mL IntraVENous 2 times per day    sodium chloride flush 0.9 % injection 5-40 mL  5-40 mL IntraVENous PRN    0.9 % sodium chloride infusion   IntraVENous PRN    ondansetron (ZOFRAN-ODT) disintegrating tablet 4 mg  4 mg Oral Q8H PRN    Or    ondansetron (ZOFRAN) injection 4 mg  4 mg IntraVENous Q6H PRN    polyethylene glycol (GLYCOLAX) packet 17 g  17 g Oral Daily PRN    acetaminophen (TYLENOL) tablet 650 mg  650 mg Oral Q6H PRN    Or    acetaminophen (TYLENOL) suppository 650 mg  650 mg Rectal Q6H PRN    apixaban (ELIQUIS) tablet 5 mg  5 mg Oral BID    metoprolol succinate (TOPROL XL) extended release tablet 25 mg  25 mg Oral Daily    QUEtiapine (SEROQUEL) tablet 50 mg  50 mg Oral Daily    melatonin tablet 3 mg  3 mg Oral Nightly PRN       Signed:  Gilma Cheney MD    Part of this note may have been written by using a voice dictation software. The note has been proof read but may still contain some grammatical/other typographical errors.

## 2022-12-07 NOTE — PROGRESS NOTES
Pharmacy Note    [facility] ([phone]) to clarify PTA med list.  Priscilla Arguello with Carolian Almanzar RN to clarify. Pt currently takes the following:  Eliquis 5 mg BID  Lorazepam 0.5 mg po q8h as needed  Melatonin 9 mg HS  Toprol XL 25 mg daily  Tylenol 500 mg TID  Seroquel 50 mg every morning  Vit D3 400 units daily    Updated PTA list in the chart. Sent a PS message to night MD (Dr Tashi Magdaleno) to make him aware of updated PTA list needing review.     Thank you,    Derrick Tirado, PharmD

## 2022-12-08 LAB
ANION GAP SERPL CALC-SCNC: 7 MMOL/L (ref 2–11)
BASOPHILS # BLD: 0.1 K/UL (ref 0–0.2)
BASOPHILS NFR BLD: 1 % (ref 0–2)
BUN SERPL-MCNC: 12 MG/DL (ref 8–23)
CALCIUM SERPL-MCNC: 8.2 MG/DL (ref 8.3–10.4)
CHLORIDE SERPL-SCNC: 110 MMOL/L (ref 101–110)
CO2 SERPL-SCNC: 24 MMOL/L (ref 21–32)
CREAT SERPL-MCNC: 0.82 MG/DL (ref 0.8–1.5)
DIFFERENTIAL METHOD BLD: ABNORMAL
EOSINOPHIL # BLD: 0.1 K/UL (ref 0–0.8)
EOSINOPHIL NFR BLD: 1 % (ref 0.5–7.8)
ERYTHROCYTE [DISTWIDTH] IN BLOOD BY AUTOMATED COUNT: 13.2 % (ref 11.9–14.6)
GLUCOSE SERPL-MCNC: 108 MG/DL (ref 65–100)
HCT VFR BLD AUTO: 31.8 % (ref 41.1–50.3)
HGB BLD-MCNC: 10.3 G/DL (ref 13.6–17.2)
IMM GRANULOCYTES # BLD AUTO: 0 K/UL (ref 0–0.5)
IMM GRANULOCYTES NFR BLD AUTO: 0 % (ref 0–5)
LYMPHOCYTES # BLD: 1 K/UL (ref 0.5–4.6)
LYMPHOCYTES NFR BLD: 13 % (ref 13–44)
MAGNESIUM SERPL-MCNC: 2.1 MG/DL (ref 1.8–2.4)
MCH RBC QN AUTO: 28.7 PG (ref 26.1–32.9)
MCHC RBC AUTO-ENTMCNC: 32.4 G/DL (ref 31.4–35)
MCV RBC AUTO: 88.6 FL (ref 82–102)
MONOCYTES # BLD: 1.4 K/UL (ref 0.1–1.3)
MONOCYTES NFR BLD: 17 % (ref 4–12)
NEUTS SEG # BLD: 5.4 K/UL (ref 1.7–8.2)
NEUTS SEG NFR BLD: 67 % (ref 43–78)
NRBC # BLD: 0 K/UL (ref 0–0.2)
PLATELET # BLD AUTO: 204 K/UL (ref 150–450)
PMV BLD AUTO: 9.4 FL (ref 9.4–12.3)
POTASSIUM SERPL-SCNC: 3.5 MMOL/L (ref 3.5–5.1)
RBC # BLD AUTO: 3.59 M/UL (ref 4.23–5.6)
SARS-COV-2 RDRP RESP QL NAA+PROBE: NOT DETECTED
SODIUM SERPL-SCNC: 141 MMOL/L (ref 133–143)
SOURCE: NORMAL
WBC # BLD AUTO: 8 K/UL (ref 4.3–11.1)

## 2022-12-08 PROCEDURE — 2580000003 HC RX 258: Performed by: HOSPITALIST

## 2022-12-08 PROCEDURE — 6370000000 HC RX 637 (ALT 250 FOR IP): Performed by: NURSE PRACTITIONER

## 2022-12-08 PROCEDURE — 2580000003 HC RX 258: Performed by: NURSE PRACTITIONER

## 2022-12-08 PROCEDURE — 87635 SARS-COV-2 COVID-19 AMP PRB: CPT

## 2022-12-08 PROCEDURE — 80048 BASIC METABOLIC PNL TOTAL CA: CPT

## 2022-12-08 PROCEDURE — 6360000002 HC RX W HCPCS: Performed by: HOSPITALIST

## 2022-12-08 PROCEDURE — 36415 COLL VENOUS BLD VENIPUNCTURE: CPT

## 2022-12-08 PROCEDURE — 85025 COMPLETE CBC W/AUTO DIFF WBC: CPT

## 2022-12-08 PROCEDURE — 97530 THERAPEUTIC ACTIVITIES: CPT

## 2022-12-08 PROCEDURE — 83735 ASSAY OF MAGNESIUM: CPT

## 2022-12-08 PROCEDURE — 1100000003 HC PRIVATE W/ TELEMETRY

## 2022-12-08 PROCEDURE — 97535 SELF CARE MNGMENT TRAINING: CPT

## 2022-12-08 RX ADMIN — METOPROLOL SUCCINATE 25 MG: 25 TABLET, EXTENDED RELEASE ORAL at 09:08

## 2022-12-08 RX ADMIN — QUETIAPINE FUMARATE 50 MG: 25 TABLET ORAL at 09:08

## 2022-12-08 RX ADMIN — CEFTRIAXONE 1000 MG: 1 INJECTION, POWDER, FOR SOLUTION INTRAMUSCULAR; INTRAVENOUS at 17:32

## 2022-12-08 RX ADMIN — Medication 3 MG: at 20:55

## 2022-12-08 RX ADMIN — SODIUM CHLORIDE, PRESERVATIVE FREE 10 ML: 5 INJECTION INTRAVENOUS at 09:08

## 2022-12-08 RX ADMIN — APIXABAN 5 MG: 5 TABLET, FILM COATED ORAL at 20:55

## 2022-12-08 RX ADMIN — SODIUM CHLORIDE, PRESERVATIVE FREE 10 ML: 5 INJECTION INTRAVENOUS at 20:55

## 2022-12-08 RX ADMIN — APIXABAN 5 MG: 5 TABLET, FILM COATED ORAL at 09:08

## 2022-12-08 RX ADMIN — ACETAMINOPHEN 650 MG: 325 TABLET, FILM COATED ORAL at 17:33

## 2022-12-08 ASSESSMENT — PAIN SCALES - WONG BAKER
WONGBAKER_NUMERICALRESPONSE: 2
WONGBAKER_NUMERICALRESPONSE: 2

## 2022-12-08 ASSESSMENT — PAIN SCALES - PAIN ASSESSMENT IN ADVANCED DEMENTIA (PAINAD)
TOTALSCORE: 0
CONSOLABILITY: 0
BREATHING: 0
BODYLANGUAGE: 0
NEGVOCALIZATION: 0
FACIALEXPRESSION: 0

## 2022-12-08 ASSESSMENT — PAIN SCALES - GENERAL: PAINLEVEL_OUTOF10: 0

## 2022-12-08 NOTE — PROGRESS NOTES
Hospitalist Progress Note   Admit Date:  2022  3:18 AM   Name:  Myna Sandhoff Sr.   Age:  80 y.o. Sex:  male  :  1932   MRN:  [id number]   Room:  [facility]/    Presenting Complaint: No chief complaint on file. 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 male with medical history of vascular dementia, AFIB on apixaban admitted  for acute encephalopathy related to UTI. He met sepsis criteria. Started on Rocephin. . Rapid covid/influenza negative. Subjective & 24hr Events (22): Patient was seen and examined at the bedside. Patient did not follow commands very well. Still some mild confusion. Sitter at bedside. Patient at times will just mumble. Assessment & Plan:   Acute encephalopathy  -Patient with urinary tract infection plus 1 out of 2 blood cultures for MRSA  - Continue to follow-up repeat blood culture  - Infectious disease consulted, appreciate recommendations  - Complete 5 days Rocephin per infectious disease, end of treatment   - Echo with ejection fraction of 55 to 60% with abnormal diastolic dysfunction    Sepsis  - Likely secondary to urinary tract infection  - Continue to monitor blood cultures  - Continue antibiotics    Atrial fibrillation  - Continue home beta-blocker and Eliquis  - Continue to monitor heart rate    Vascular dementia  - Continue home Seroquel  - Melatonin for sleep hygiene  - Patient higher fall risk/flight risk  - Sitter at bedside    Essential hypertension  - Continue home beta-blocker  - Blood pressure currently controlled      Anticipated discharge needs:    Dispo pending clinical course. PT/OT return to memory care. Patient will need to finish IV antibiotics. Patient likely be discharged next 24 hours.     Diet:  ADULT DIET; Easy to Chew  DVT PPx: Eliquis  Code status: DNR    Hospital Problems:  Principal Problem:    Acute encephalopathy  Resolved Problems:    * No resolved hospital problems. *      Objective:   Patient Vitals for the past 24 hrs:   Temp Pulse Resp BP SpO2   12/08/22 1138 99.9 °F (37.7 °C) 93 -- 115/63 93 %   12/08/22 0830 99.1 °F (37.3 °C) 93 19 (!) 149/70 91 %   12/08/22 0744 -- 96 -- (!) 59/39 95 %   12/08/22 0402 99.9 °F (37.7 °C) 78 18 (!) 97/55 90 %   12/08/22 0036 -- 98 18 138/70 91 %   12/07/22 1450 99.7 °F (37.6 °C) (!) 107 20 129/79 90 %       Oxygen Therapy  SpO2: 93 %  O2 Device: None (Room air)    Estimated body mass index is 23.92 kg/m² as calculated from the following:    Height as of this encounter: 5' 9\" (1.753 m). Weight as of this encounter: 162 lb (73.5 kg). No intake or output data in the 24 hours ending 12/08/22 1224        Physical Exam:   Blood pressure 115/63, pulse 93, temperature 99.9 °F (37.7 °C), resp. rate 19, height 5' 9\" (1.753 m), weight 162 lb (73.5 kg), SpO2 93 %. General:    Well nourished. Head:  Normocephalic, atraumatic  Eyes:  Sclerae appear normal.  Pupils equally round. ENT:  Nares appear normal, no drainage. Moist oral mucosa  Neck:  No restricted ROM. Trachea midline   CV:   IRR. No m/r/g. No jugular venous distension. Lungs:   CTAB. No wheezing, rhonchi, or rales. Symmetric expansion. Abdomen: Bowel sounds present. Soft, nontender, nondistended. Extremities: No cyanosis or clubbing. No edema  Skin:     No rashes and normal coloration. Warm and dry. Neuro:  CN II-XII grossly intact. Sensation intact. Confused, does not follow commands well. Psych:  Normal mood and affect. I have personally reviewed labs and tests showing:  Recent Labs:  Recent Results (from the past 48 hour(s))   MSSA/MRSA Screen BY PCR    Collection Time: 12/06/22 12:36 PM    Specimen: Nares; Swab   Result Value Ref Range    Special Requests NO SPECIAL REQUESTS      Culture        SA target not detected.                                  A MRSA NEGATIVE, SA NEGATIVE test result does not preclude MRSA or SA nasal colonization.    CBC with Auto Differential    Collection Time: 12/07/22  4:05 AM   Result Value Ref Range    WBC 7.8 4.3 - 11.1 K/uL    RBC 3.34 (L) 4.23 - 5.6 M/uL    Hemoglobin 9.6 (L) 13.6 - 17.2 g/dL    Hematocrit 30.7 (L) 41.1 - 50.3 %    MCV 91.9 82.0 - 102.0 FL    MCH 28.7 26.1 - 32.9 PG    MCHC 31.3 (L) 31.4 - 35.0 g/dL    RDW 13.4 11.9 - 14.6 %    Platelets 362 333 - 718 K/uL    MPV 10.0 9.4 - 12.3 FL    nRBC 0.00 0.0 - 0.2 K/uL    Differential Type AUTOMATED      Seg Neutrophils 73 43 - 78 %    Lymphocytes 12 (L) 13 - 44 %    Monocytes 13 (H) 4.0 - 12.0 %    Eosinophils % 1 0.5 - 7.8 %    Basophils 1 0.0 - 2.0 %    Immature Granulocytes 0 0.0 - 5.0 %    Segs Absolute 5.7 1.7 - 8.2 K/UL    Absolute Lymph # 1.0 0.5 - 4.6 K/UL    Absolute Mono # 1.0 0.1 - 1.3 K/UL    Absolute Eos # 0.1 0.0 - 0.8 K/UL    Basophils Absolute 0.1 0.0 - 0.2 K/UL    Absolute Immature Granulocyte 0.0 0.0 - 0.5 K/UL   Basic Metabolic Panel w/ Reflex to MG    Collection Time: 12/07/22  4:05 AM   Result Value Ref Range    Sodium 142 133 - 143 mmol/L    Potassium 3.5 3.5 - 5.1 mmol/L    Chloride 112 (H) 101 - 110 mmol/L    CO2 24 21 - 32 mmol/L    Anion Gap 6 2 - 11 mmol/L    Glucose 113 (H) 65 - 100 mg/dL    BUN 12 8 - 23 MG/DL    Creatinine 0.82 0.8 - 1.5 MG/DL    Est, Glom Filt Rate >60 >60 ml/min/1.73m2    Calcium 8.2 (L) 8.3 - 10.4 MG/DL   Magnesium    Collection Time: 12/07/22  4:05 AM   Result Value Ref Range    Magnesium 2.0 1.8 - 2.4 mg/dL   Transthoracic echocardiogram (TTE) complete with contrast, bubble, strain, and 3D PRN    Collection Time: 12/07/22  9:30 AM   Result Value Ref Range    LV EDV A2C 81 mL    LV EDV A4C 78 mL    LV ESV A2C 28 mL    LV ESV A4C 30 mL    IVSd 1.2 (A) 0.6 - 1.0 cm    LVIDd 4.2 4.2 - 5.9 cm    LVIDs 3.1 cm    LVOT Diameter 2.1 cm    LVOT Mean Gradient 2 mmHg    LVOT VTI 17.5 cm    LVOT Peak Velocity 1.0 m/s    LVOT Peak Gradient 4 mmHg LVPWd 1.2 (A) 0.6 - 1.0 cm    LV E' Lateral Velocity 17 cm/s    LV E' Septal Velocity 10 cm/s    LV Ejection Fraction A2C 66 %    LV Ejection Fraction A4C 62 %    EF BP 64 55 - 100 %    LVOT Area 3.5 cm2    LVOT SV 60.6 ml    LA Minor Axis 4.8 cm    LA Major Axis 6.4 cm    LA Area 2C 19.5 cm2    LA Area 4C 27.4 cm2    LA Volume 2C 64 (A) 18 - 58 mL    LA Volume 4C 90 (A) 18 - 58 mL    LA Diameter 4.7 cm    AV Mean Gradient 5 mmHg    AV VTI 26.9 cm    AV Mean Velocity 1.0 m/s    AV Peak Velocity 1.5 m/s    AV Peak Gradient 9 mmHg    AV Area by VTI 2.3 cm2    AV Area by Peak Velocity 2.2 cm2    Aortic Root 3.7 cm    Ascending Aorta 3.5 cm    IVC Expiration 2.3 cm    MV Nyquist Velocity 39 cm/s    MR Radius PISA 0.40 cm    MR .0 cm    MR Peak Velocity 4.5 m/s    MR Peak Gradient 81 mmHg    MV E Wave Deceleration Time 187.0 ms    MV E Velocity 1.26 m/s    MV EROA PISA 8.7 cm2    RV Basal Dimension 4.6 cm    RV Mid Dimension 3.0 cm    RV Free Wall Peak S' 10 cm/s    TAPSE 1.8 1.7 cm    TR Max Velocity 3.11 m/s    TR Peak Gradient 39 mmHg    Body Surface Area 1.89 m2    Fractional Shortening 2D 26 28 - 44 %    LV ESV Index A4C 16 mL/m2    LV EDV Index A4C 41 mL/m2    LV ESV Index A2C 15 mL/m2    LV EDV Index A2C 43 mL/m2    LVIDd Index 2.22 cm/m2    LVIDs Index 1.64 cm/m2    LV RWT Ratio 0.57     LV Mass 2D 178.2 88 - 224 g    LV Mass 2D Index 94.3 49 - 115 g/m2    E/E' Ratio (Averaged) 10.01     E/E' Lateral 7.41     E/E' Septal 12.60     LVOT Stroke Volume Index 32.1 mL/m2    LA Volume Index 2C 34 16 - 34 mL/m2    LA Volume Index 4C 48 (A) 16 - 34 mL/m2    LA Size Index 2.49 cm/m2    LA/AO Root Ratio 1.27     Ao Root Index 1.96 cm/m2    Ascending Aorta Index 1.85 cm/m2    AV Velocity Ratio 0.67     LVOT:AV VTI Index 0.65     LOREN/BSA VTI 1.2 cm2/m2    LOREN/BSA Peak Velocity 1.2 cm2/m2    MR Regurg Volume PISA 1,010. 16 mL    Est. RA Pressure 8 mmHg    RVSP 47 mmHg   CBC with Auto Differential    Collection Time: 12/08/22  4:11 AM   Result Value Ref Range    WBC 8.0 4.3 - 11.1 K/uL    RBC 3.59 (L) 4.23 - 5.6 M/uL    Hemoglobin 10.3 (L) 13.6 - 17.2 g/dL    Hematocrit 31.8 (L) 41.1 - 50.3 %    MCV 88.6 82.0 - 102.0 FL    MCH 28.7 26.1 - 32.9 PG    MCHC 32.4 31.4 - 35.0 g/dL    RDW 13.2 11.9 - 14.6 %    Platelets 066 505 - 257 K/uL    MPV 9.4 9.4 - 12.3 FL    nRBC 0.00 0.0 - 0.2 K/uL    Differential Type AUTOMATED      Seg Neutrophils 67 43 - 78 %    Lymphocytes 13 13 - 44 %    Monocytes 17 (H) 4.0 - 12.0 %    Eosinophils % 1 0.5 - 7.8 %    Basophils 1 0.0 - 2.0 %    Immature Granulocytes 0 0.0 - 5.0 %    Segs Absolute 5.4 1.7 - 8.2 K/UL    Absolute Lymph # 1.0 0.5 - 4.6 K/UL    Absolute Mono # 1.4 (H) 0.1 - 1.3 K/UL    Absolute Eos # 0.1 0.0 - 0.8 K/UL    Basophils Absolute 0.1 0.0 - 0.2 K/UL    Absolute Immature Granulocyte 0.0 0.0 - 0.5 K/UL   Basic Metabolic Panel w/ Reflex to MG    Collection Time: 12/08/22  4:11 AM   Result Value Ref Range    Sodium 141 133 - 143 mmol/L    Potassium 3.5 3.5 - 5.1 mmol/L    Chloride 110 101 - 110 mmol/L    CO2 24 21 - 32 mmol/L    Anion Gap 7 2 - 11 mmol/L    Glucose 108 (H) 65 - 100 mg/dL    BUN 12 8 - 23 MG/DL    Creatinine 0.82 0.8 - 1.5 MG/DL    Est, Glom Filt Rate >60 >60 ml/min/1.73m2    Calcium 8.2 (L) 8.3 - 10.4 MG/DL   Magnesium    Collection Time: 12/08/22  4:11 AM   Result Value Ref Range    Magnesium 2.1 1.8 - 2.4 mg/dL       I have personally reviewed imaging studies showing:   Other Studies:  No orders to display       Current Meds:  Current Facility-Administered Medications   Medication Dose Route Frequency    cefTRIAXone (ROCEPHIN) 1,000 mg in sodium chloride 0.9 % 50 mL IVPB mini-bag  1,000 mg IntraVENous Q24H    0.9 % sodium chloride infusion   IntraVENous Continuous    sodium chloride flush 0.9 % injection 5-40 mL  5-40 mL IntraVENous 2 times per day    sodium chloride flush 0.9 % injection 5-40 mL  5-40 mL IntraVENous PRN    0.9 % sodium chloride infusion   IntraVENous PRN    ondansetron (ZOFRAN-ODT) disintegrating tablet 4 mg  4 mg Oral Q8H PRN    Or    ondansetron (ZOFRAN) injection 4 mg  4 mg IntraVENous Q6H PRN    polyethylene glycol (GLYCOLAX) packet 17 g  17 g Oral Daily PRN    acetaminophen (TYLENOL) tablet 650 mg  650 mg Oral Q6H PRN    Or    acetaminophen (TYLENOL) suppository 650 mg  650 mg Rectal Q6H PRN    apixaban (ELIQUIS) tablet 5 mg  5 mg Oral BID    metoprolol succinate (TOPROL XL) extended release tablet 25 mg  25 mg Oral Daily    QUEtiapine (SEROQUEL) tablet 50 mg  50 mg Oral Daily    melatonin tablet 3 mg  3 mg Oral Nightly PRN       Signed:  Ned Crow MD    Part of this note may have been written by using a voice dictation software. The note has been proof read but may still contain some grammatical/other typographical errors.

## 2022-12-08 NOTE — PROGRESS NOTES
ACUTE PHYSICAL THERAPY GOALS:   (Developed with and agreed upon by patient and/or caregiver.)  STG:  (1.)Mr. Tylor Murrieta will move from supine to sit and sit to supine  with STAND BY ASSIST within 4-7 treatment day(s). (2.)Mr. Tylor Murrieta will transfer from bed to chair and chair to bed with STAND BY ASSIST using the least restrictive device within 4-7 treatment day(s). (3.)Mr. Jonas will ambulate with STAND BY ASSIST for 100 feet with the least restrictive device within 4-7 treatment day(s). 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 Tylor Murrieta participated fairly well today. He did require increased assist for bed mobility with difficulty following directions. His brief was wet so he had to be rolled L and R for changing/hygiene. Patient did get up and ambulate out in the hallway with B hand hold assist and manual cueing/coaxing. Once back on the bed, patient was able to position himself back in supine/sidelying. Will check on  patient while admitted but no therapy recommended at d/c. Continue to recommend return to Memory Care. SUBJECTIVE:   Mr. Tylor Murrieta in bed with sitter present.     Social/Functional Type of Home: Assisted living (memory care)  Home Layout: One level  OBJECTIVE:     PAIN: VITALS / O2: PRECAUTION / LINES / DRAINS:   Pre Treatment:   Pain Assessment: Face, Legs, Activity, Cry, and Consolability (FLACC)      Post Treatment: 0 Vitals Oxygen    None    RESTRICTIONS/PRECAUTIONS:  Restrictions/Precautions  Restrictions/Precautions: Fall Risk  Restrictions/Precautions: Fall Risk     MOBILITY: I Mod I S SBA CGA Min Mod Max Total  NT x2 Comments:   Bed Mobility    Rolling [] [] [] [] [] [] [] [x] [] [] []    Supine to Sit [] [] [] [] [] [] [] [x] [] [] []    Scooting [] [] [] [] [] [] [] [] [] [] []    Sit to Supine [] [] [] [x] [] [] [] [] [] [] []    Transfers    Sit to Stand [] [] [] [] [] [] [x] [] [] [] [x]    Bed to Chair [] [] [] [] [] [] [] [] [] [] []    Stand to Sit [] [] [] [] [] [] [] [] [] [] []     [] [] [] [] [x] [] [] [] [] [] []    I=Independent, Mod I=Modified Independent, S=Supervision, SBA=Standby Assistance, CGA=Contact Guard Assistance,   Min=Minimal Assistance, Mod=Moderate Assistance, Max=Maximal Assistance, Total=Total Assistance, NT=Not Tested    BALANCE: Good Fair+ Fair Fair- Poor NT Comments   Sitting Static [x] [] [] [] [] []    Sitting Dynamic [] [x] [] [] [] []              Standing Static [] [] [x] [] [] []    Standing Dynamic [] [] [x] [] [] []      GAIT: I Mod I S SBA CGA Min Mod Max Total  NT x2 Comments:   Level of Assistance [] [] [] [] [] [x] [] [] [] [] [x]    Distance 100 feet    DME B HHA    Gait Quality Decreased boyd     Weightbearing Status      Stairs      I=Independent, Mod I=Modified Independent, S=Supervision, SBA=Standby Assistance, CGA=Contact Guard Assistance,   Min=Minimal Assistance, Mod=Moderate Assistance, Max=Maximal Assistance, Total=Total Assistance, NT=Not Tested    PLAN:   FREQUENCY AND DURATION: Daily for duration of hospital stay or until stated goals are met, whichever comes first.    TREATMENT:   TREATMENT:   Therapeutic Activity (27 Minutes):  Therapeutic activity included Rolling, Supine to Sit, Sit to Supine, Transfer Training, Ambulation on level ground, Sitting balance , and Standing balance to improve functional Activity tolerance, Balance, Coordination, Mobility, and Strength.     TREATMENT GRID:  N/A    AFTER TREATMENT PRECAUTIONS: Bed, Bed/Chair Locked, Call light within reach, Needs within reach, and sitter at bedside    INTERDISCIPLINARY COLLABORATION:  RN/ PCT and OT/ MAYORGA    EDUCATION: Education Given To: Patient  Education Provided: Plan of Care  Education Method: Verbal  Education Outcome: Unable to demonstrate understanding    TIME IN/OUT:  Time In: 0940  Time Out: [address]  Minutes: [address] Paul Joiner PT

## 2022-12-08 NOTE — CARE COORDINATION
Katharina spoke with Neida Caldwell ([phone]) Rn this pm about pt's return which is planned for tomorrow. She requested a rapid COVID test, discharge summary and scripts for all new meds. She requested Sw place orders for PT/OT to see at facility as they have their own in house therapists-orders written. Also mentioned to Neida Caldwell about  R heel wound/ulcer noted by I/D Md. If specific nursing/wound care/dressing changes are needed-those nursing orders need to be written and sent also with pt's packet. Neida Like stated they would use [facility] as he has had them in the past. Wound care nurse being consulted today (3pm). Sw to follow up in am about transport and nursing needs.   Susan Wright

## 2022-12-08 NOTE — PROGRESS NOTES
Right heel allevyn removed and assessed with Evan Al RN. Stageable wound, dime sized to right heel. Allevyn placed to cover wound. Right great toe black around toenail. Redness to distal end of right great toe. Heels floated to alleviate pressure on heels.

## 2022-12-08 NOTE — PROGRESS NOTES
ACUTE OCCUPATIONAL THERAPY GOALS:   (Developed with and agreed upon by patient and/or caregiver.)  1. Patient will perform grooming with supervision. 2. Patient will perform upper body dressing with min assist.  3. Patient will perform lower body dressing with min assist.   4. Patient will perform bathing with supervision. 5. Patient will perform toilet transfer with contact guard assist.   6. Patient will perform ADL functional mobility and tranfers in room with contact guard assist.     Goals to be achieved in 7 days. OCCUPATIONAL THERAPY Daily Note and AM       OT Visit Days: 2  Acknowledge Orders  Time  OT Charge Capture  Rehab Caseload Tracker      Jose Enrique Erickson is a 80 y.o. 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 Sarah Valenzuela admitted with above diagnosis. Pt confused and cannot follow commands. [place] [facility] [place] Pt independent prior to admission. Pt presents with decreased self care and functional mobility. Pt would benefit from skilled OT to increase independence. Today, pt worked on functional household ambulation in room. Pt voided on floor. Pt needed verbal cues and redirecting for task. Pt needed assist with changing gown and sponging cammie area. Pt left in recliner with breakfast tray setup and posey pad intact. 12-8-22 Pt supine on contact. Pt required increased assist for BM and difficulty following directions. Pt talking in circles. Pt with wet brief. Pt total assist for don/doffing brief and hygiene. Pt completed household distance ambulation with B hand hold assist with manual and verbal cueing. Pt returned to bed with needs in reach and sitter present. Recommend back to memory care. [employer] [facility] 6 Clicks Daily Activity Inpatient Short Form:    AM-PAC Daily Activity Inpatient   How much help for putting on and taking off regular lower body clothing?: A Lot  How much help for Bathing?: A Lot  How much help for Toileting?: A Lot  How much help for putting on and taking off regular upper body clothing?: A Lot  How much help for taking care of personal grooming?: A Lot  How much help for eating meals?: A Little  [facility] Inpatient Daily Activity Raw Score: 13  AM-PAC Inpatient ADL T-Scale Score : 32.03  ADL Inpatient CMS 0-100% Score: 63.03  ADL Inpatient CMS G-Code Modifier : CL           SUBJECTIVE:     Mr. Desi Gurrola supine in bed with sitter.      Social/Functional Type of Home: Assisted living (memory care)  Home Layout: One level    OBJECTIVE:     LINES / Cookie Kristen / AIRWAY: NA    RESTRICTIONS/PRECAUTIONS:  Restrictions/Precautions: Fall Risk    PAIN: VITALS / O2:   Pre Treatment: 0/10         Post Treatment: none       Vitals          Oxygen            GROSS EVALUATION: INTACT IMPAIRED   (See Comments)   UE AROM [] []Decreased but functional    UE PROM [] []   Strength []  Decreased but functional      Posture / Balance [] Posture: Fair  Sitting - Static: Good  Sitting - Dynamic: Fair, +  Standing - Static: Fair, +  Standing - Dynamic: Fair   Sensation [x]     Coordination []  Decreased but functional      Tone [x]       Edema []    Activity Tolerance []  fair     Hand Dominance R [] L []      COGNITION/  PERCEPTION: INTACT IMPAIRED   (See Comments)   Orientation []  dementia   Vision []     Hearing []     Cognition  []  dementia   Perception []       MOBILITY: I Mod I S SBA CGA Min Mod Max Total  NT x2 Comments:   Bed Mobility    Rolling [] [] [] [] [] [] [] [x] [] [] []    Supine to Sit [] [] [] [] [] [] [] [x] [] [] []    Scooting [] [] [] [] [] [] [] [] [] [] []    Sit to Supine [] [] [] [x] [] [] [] [] [] [] []    Transfers    Sit to Stand [] [] [] [] [] [] [x] [] [] [] [x] Hand held    Bed to Chair [] [] [] [] [] [] [] [] [] [] []    Stand to Sit [] [] [] [] [] [] [] [] [] [] []    Tub/Shower [] [] [] [] [] [] [] [] [] [] []     Toilet [] [] [] [] [] [] [] [] [] [] []      [] [] [] [] [x] [] [] [] [] [] []    I=Independent, Mod I=Modified Independent, S=Supervision/Setup, SBA=Standby Assistance, CGA=Contact Guard Assistance, Min=Minimal Assistance, Mod=Moderate Assistance, Max=Maximal Assistance, Total=Total Assistance, NT=Not Tested    ACTIVITIES OF DAILY LIVING: I Mod I S SBA CGA Min Mod Max Total NT Comments   BASIC ADLs:              Upper Body Bathing  [] [] [] [] [] [] [] [] [] []    Lower Body Bathing [] [] [] [] [] [] [] [] [] []    Toileting [] [] [] [] [] [] [] [] [x] [] Don/doff brief and hygiene   Upper Body Dressing [] [] [] [] [] [] [] [] [] []    Lower Body Dressing [] [] [] [] [] [] [] [] [] []    Feeding [] [] [] [] [] [] [] [] [] []    Grooming [] [] [] [] [] [] [] [] [] []    Personal Device Care [] [] [] [] [] [] [] [] [] []    Functional Mobility [] [] [] [] [] [] [] [] [] []    I=Independent, Mod I=Modified Independent, S=Supervision/Setup, SBA=Standby Assistance, CGA=Contact Guard Assistance, Min=Minimal Assistance, Mod=Moderate Assistance, Max=Maximal Assistance, Total=Total Assistance, NT=Not Tested    PLAN:   FREQUENCY/DURATION   OT Plan of Care: 3 times/week for duration of hospital stay or until stated goals are met, whichever comes first.    PROBLEM LIST:   (Skilled intervention is medically necessary to address:)  Decreased ADL/Functional Activities  Decreased Activity Tolerance  Decreased Balance  Decreased Strength  Decreased Transfer Abilities   INTERVENTIONS PLANNED:  (Benefits and precautions of occupational therapy have been discussed with the patient.)  Self Care Training  Therapeutic Activity  Therapeutic Exercise/HEP  Neuromuscular Re-education  Manual Therapy  Education         TREATMENT:       TREATMENT:   Co-Treatment PT/OT necessary due to patient's decreased overall endurance/tolerance levels, as well as need for high level skilled assistance to complete functional transfers/mobility and functional tasks  Self Care (25 minutes): Patient participated in toileting and hygiene ADLs in standing and supine with minimal verbal cueing to increase independence and increase activity tolerance. Patient also participated in functional mobility, functional transfer, and adaptive equipment training to increase independence and increase activity tolerance.      TREATMENT GRID:  N/A    AFTER TREATMENT PRECAUTIONS: Bed, Bed/Chair Locked, Call light within reach, Needs within reach, RN notified, and sitter present    INTERDISCIPLINARY COLLABORATION:  RN/ PCT, PT/ PTA, and OT/ MAYORGA    EDUCATION:  Education Given To: Patient  Education Provided: Plan of Care  Education Method: Demonstration  Barriers to Learning: Cognition  Education Outcome: Continued education needed    TOTAL TREATMENT DURATION AND TIME:  Time In: 0930  Time Out: 6918  Minutes: [address], OT

## 2022-12-09 VITALS
BODY MASS INDEX: 23.99 KG/M2 | WEIGHT: 162 LBS | RESPIRATION RATE: 17 BRPM | DIASTOLIC BLOOD PRESSURE: 61 MMHG | HEART RATE: 75 BPM | TEMPERATURE: 98.1 F | HEIGHT: 69 IN | SYSTOLIC BLOOD PRESSURE: 131 MMHG | OXYGEN SATURATION: 96 %

## 2022-12-09 LAB
BACTERIA SPEC CULT: NORMAL
SERVICE CMNT-IMP: NORMAL

## 2022-12-09 PROCEDURE — 97530 THERAPEUTIC ACTIVITIES: CPT

## 2022-12-09 PROCEDURE — 2580000003 HC RX 258: Performed by: NURSE PRACTITIONER

## 2022-12-09 PROCEDURE — 6370000000 HC RX 637 (ALT 250 FOR IP): Performed by: NURSE PRACTITIONER

## 2022-12-09 PROCEDURE — 2580000003 HC RX 258: Performed by: HOSPITALIST

## 2022-12-09 PROCEDURE — 6360000002 HC RX W HCPCS: Performed by: HOSPITALIST

## 2022-12-09 RX ADMIN — APIXABAN 5 MG: 5 TABLET, FILM COATED ORAL at 08:51

## 2022-12-09 RX ADMIN — METOPROLOL SUCCINATE 25 MG: 25 TABLET, EXTENDED RELEASE ORAL at 08:51

## 2022-12-09 RX ADMIN — CEFTRIAXONE 1000 MG: 1 INJECTION, POWDER, FOR SOLUTION INTRAMUSCULAR; INTRAVENOUS at 05:28

## 2022-12-09 RX ADMIN — SODIUM CHLORIDE: 9 INJECTION, SOLUTION INTRAVENOUS at 04:29

## 2022-12-09 RX ADMIN — QUETIAPINE FUMARATE 50 MG: 25 TABLET ORAL at 08:51

## 2022-12-09 ASSESSMENT — PAIN SCALES - PAIN ASSESSMENT IN ADVANCED DEMENTIA (PAINAD)
CONSOLABILITY: 0
BODYLANGUAGE: 0
BREATHING: 0
NEGVOCALIZATION: 1
FACIALEXPRESSION: 0
TOTALSCORE: 1

## 2022-12-09 NOTE — PROGRESS NOTES
Hospitalist Discharge Summary   Admit Date:  2022  3:18 AM   DC Note date: 2022  Name:  Sommer Gutierrez Sr.   Age:  80 y.o. Sex:  male  :  1932   MRN:  [id number]   Room:  [facility]  PCP:  Lois Andrews MD    Presenting Complaint: No chief complaint on file. 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 [facility] Course:  80 y.o. male with medical history of vascular dementia, AFIB on apixaban admitted  for acute encephalopathy related to UTI. He met sepsis criteria. Started on Rocephin. . Rapid covid/influenza negative. Patient is acute encephalopathy likely secondary to urinary tract infection. Patient did have 1 out of 2 positive blood cultures for MRSA. Repeat follow-up blood culture nothing grown to date. Patient finished 5-day course of Rocephin per infectious disease with end of treatment . Echo with ejection fraction of 55 to 60% with abnormal diastolic dysfunction. Patient was septic likely secondary to this urinary tract infection. Atrial fibrillation patient was continued on home beta-blocker and Eliquis. For patient's other comorbid conditions she was restarted back on his home medications. Patient will be getting discharged back to memory care on . Wound care to make recommendations for wound on heel. All questions answered. Discussed plan with patient's son. Disposition: Discharged to memory care  Diet: ADULT DIET; Easy to Chew  Code Status: DNR    Follow Ups:  Follow-up PCP next 3 to 5 days    Time spent in patient discharge and coordination 46 minutes.         Follow up labs/diagnostics (ultimately defer to outpatient provider):  Defer to outpatient provider    Plan was discussed with patient's son. All questions answered. Patient was stable at time of discharge. Instructions given to call a physician or return if any concerns. Current Discharge Medication List        CONTINUE these medications which have NOT CHANGED    Details   LORazepam (ATIVAN) 0.5 MG tablet Take 0.5 mg by mouth every 8 hours as needed for Anxiety. melatonin 3 MG TABS tablet Take 9 mg by mouth nightly      acetaminophen (TYLENOL) 500 MG tablet Take 500 mg by mouth in the morning, at noon, and at bedtime      QUEtiapine (SEROQUEL) 50 MG tablet Take 50 mg by mouth daily      vitamin D3 (CHOLECALCIFEROL) 10 MCG (400 UNIT) TABS tablet Take 400 Units by mouth daily      apixaban (ELIQUIS) 5 MG TABS tablet TAKE 1 TABLET BY MOUTH TWICE DAILY      metoprolol succinate (TOPROL XL) 25 MG extended release tablet TAKE 1 TABLET BY MOUTH ONCE DAILY *DO NOT CRUSH OR CHEW*             Procedures done this admission:  * No surgery found *    Consults this admission:  IP CONSULT TO PHARMACY  IP CONSULT TO INFECTIOUS DISEASES  IP WOUND CARE NURSE CONSULT TO EVAL  IP CONSULT TO SOCIAL WORK    Echocardiogram results:  12/05/22    TRANSTHORACIC ECHOCARDIOGRAM (TTE) COMPLETE (CONTRAST/BUBBLE/3D PRN) 12/07/2022 12:54 PM (Final)    Interpretation Summary    Left Ventricle: Normal left ventricular systolic function with a visually estimated EF of 55 - 60%. Left ventricle size is normal. Mildly increased wall thickness. Normal wall motion. Abnormal diastolic function. Aortic Valve: Tricuspid valve. Mitral Valve: Mild to moderate regurgitation. Left Atrium: Left atrium is moderately dilated. Right Atrium: Right atrium is moderately dilated. Aorta: Moderately dilated aortic root. Technical qualifiers: Color flow Doppler was performed and pulse wave and/or continuous wave Doppler was performed.     Signed by: Lona Naranjo MD on 12/7/2022 12:54 PM      Diagnostic Imaging/Tests:   XR CHEST PORTABLE    Result Date: 12/4/2022  Chronic interstitial changes suspected. No acute abnormality. Labs: Results:       BMP, Mg, Phos Recent Labs     12/07/22  0405 12/08/22  0411    141   K 3.5 3.5   * 110   CO2 24 24   ANIONGAP 6 7   BUN 12 12   CREATININE 0.82 0.82   LABGLOM >60 >60   CALCIUM 8.2* 8.2*   GLUCOSE 113* 108*   MG 2.0 2.1      CBC Recent Labs     12/07/22  0405 12/08/22  0411   WBC 7.8 8.0   RBC 3.34* 3.59*   HGB 9.6* 10.3*   HCT 30.7* 31.8*   MCV 91.9 88.6   MCH 28.7 28.7   MCHC 31.3* 32.4   RDW 13.4 13.2    204   MPV 10.0 9.4   NRBC 0.00 0.00   SEGS 73 67   LYMPHOPCT 12* 13   EOSRELPCT 1 1   MONOPCT 13* 17*   BASOPCT 1 1   IMMGRAN 0 0   SEGSABS 5.7 5.4   LYMPHSABS 1.0 1.0   EOSABS 0.1 0.1   MONOSABS 1.0 1.4*   BASOSABS 0.1 0.1   ABSIMMGRAN 0.0 0.0      LFT No results for input(s): BILITOT, BILIDIR, ALKPHOS, AST, ALT, PROT, LABALBU, GLOB in the last 72 hours. Cardiac  No results found for: NTPROBNP, TROPHS   Coags No results found for: PROTIME, INR, APTT   A1c No results found for: LABA1C, EAG   Lipids No results found for: CHOL, LDLCALC, LABVLDL, HDL, CHOLHDLRATIO, TRIG   Thyroid  No results found for: Ana Sow     Most Recent UA Lab Results   Component Value Date/Time    GLUCOSEU Negative 12/04/2022 06:50 PM    BLOODU Negative 12/04/2022 06:50 PM    WBCUA 0 02/24/2022 08:55 PM    RBCUA 0-3 02/24/2022 08:55 PM    BACTERIA 0 02/24/2022 08:55 PM        Recent Labs     12/06/22  1236 12/06/22  1156 12/06/22  1155 12/04/22 2033 12/04/22  1836   CULTURE SA target not detected. A MRSA NEGATIVE, SA NEGATIVE test result does not preclude MRSA or SA nasal colonization.  NO GROWTH 3 DAYS NO GROWTH 3 DAYS 10,000 to 50,000 COLONIES/mL MIXED SKIN ARNOL ISOLATED NO GROWTH 5 DAYS       All Labs from Last 24 Hrs:  Recent Results (from the past 24 hour(s))   COVID-19, Rapid    Collection Time: 12/08/22  3:08 PM    Specimen: Nasopharyngeal   Result Value Ref Range    Source Nasopharyngeal      SARS-CoV-2, Rapid Not detected NOTD         Allergies   Allergen Reactions    Olive Oil Anaphylaxis    Menthol Other (See Comments)     Ludens cough drops, tongue felt like it was burning    Oxaprozin Other (See Comments)    Trimethobenzamide Other (See Comments)     Immunization History   Administered Date(s) Administered    Influenza Trivalent 11/14/2017    Influenza, FLUARIX, FLULAVAL, FLUZONE (age 10 mo+) AND AFLURIA, (age 1 y+), PF, 0.5mL 11/07/2018    Influenza, High Dose (Fluzone 65 yrs and older) 09/10/2014, 09/11/2015, 10/04/2016    Influenza, Triv, inactivated, subunit, adjuvanted, IM (Fluad 65 yrs and older) 10/22/2019       Recent Vital Data:  Patient Vitals for the past 24 hrs:   Temp Pulse Resp BP SpO2   12/09/22 1152 98.1 °F (36.7 °C) 75 17 131/61 96 %   12/09/22 0805 97.9 °F (36.6 °C) 79 18 122/87 97 %   12/09/22 0004 -- 97 18 (!) 101/49 91 %   12/08/22 2057 -- 67 -- 124/74 90 %   12/08/22 2055 -- 97 -- (!) 101/49 --   12/08/22 1602 -- 92 -- (!) 124/58 92 %       Oxygen Therapy  SpO2: 96 %  O2 Device: None (Room air)    Estimated body mass index is 23.92 kg/m² as calculated from the following:    Height as of this encounter: 5' 9\" (1.753 m). Weight as of this encounter: 162 lb (73.5 kg). No intake or output data in the 24 hours ending 12/09/22 1209      Physical Exam:  General:    Well nourished. No overt distress  Head:  Normocephalic, atraumatic  Eyes:  Sclerae appear normal.  Pupils equally round. HENT:  Nares appear normal, no drainage. Moist mucous membranes  Neck:  No restricted ROM. Trachea midline  CV:   RRR. No m/r/g. No JVD  Lungs:   CTAB. No wheezing, rhonchi, or rales. Respirations even, unlabored  Abdomen:   Soft, nontender, nondistended. Extremities: Warm and dry. No cyanosis or clubbing. No edema. Skin:     No rashes. Normal coloration  Neuro:  CN II-XII grossly intact. Patient has end-stage Alzheimer's.   Confused at baseline  Psych:  Normal mood and affect. Signed:  Fallon Crook MD    Part of this note may have been written by using a voice dictation software. The note has been proof read but may still contain some grammatical/other typographical errors.

## 2022-12-09 NOTE — PROGRESS NOTES
ACUTE PHYSICAL THERAPY GOALS:   (Developed with and agreed upon by patient and/or caregiver.)  STG:  (1.)Mr. Anamaria Barkley will move from supine to sit and sit to supine  with STAND BY ASSIST within 4-7 treatment day(s). (2.)Mr. Anamaria Barkley will transfer from bed to chair and chair to bed with STAND BY ASSIST using the least restrictive device within 4-7 treatment day(s). (3.)Mr. Jonas will ambulate with STAND BY ASSIST for 100 feet with the least restrictive device within 4-7 treatment day(s). PHYSICAL THERAPY: Daily Note AM   (Link to Caseload Tracking: PT Visit Days : 3  Time In/Out PT Charge Capture  Rehab Caseload Tracker  Orders    Manfred Farley Sr. is a 80 y.o. 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 Anamaria Barkley was very confused today. Bed mobility with mod/max A. He was able to sit on EOB with close SBA. His gown, linens, and brief were all wet. Returned to supine for brief change. Rolling left and right with mod/max A. He requires increased time for all mobility. Pt left in bed with sitter present. SUBJECTIVE:   Mr. Anamaria Barkley in bed with sitter present.     Social/Functional Type of Home: Assisted living (memory care)  Home Layout: One level  OBJECTIVE:     PAIN: VITALS / O2: PRECAUTION / Lesleigh Bey / DRAINS:   Pre Treatment:  none         Post Treatment:  none Vitals        Oxygen    None    RESTRICTIONS/PRECAUTIONS:  Restrictions/Precautions  Restrictions/Precautions: Fall Risk  Restrictions/Precautions: Fall Risk     MOBILITY: I Mod I S SBA CGA Min Mod Max Total  NT x2 Comments:   Bed Mobility    Rolling [] [] [] [] [] [] [x] [x] [] [] []    Supine to Sit [] [] [] [] [] [] [x] [x] [] [] []    Scooting [] [] [] [] [] [] [] [] [] [] []    Sit to Supine [] [] [] [] [] [] [x] [x] [] [] []    Transfers    Sit to Stand [] [] [] [] [] [] [x] [] [] [] [] unsafe   Bed to Chair [] [] [] [] [] [] [] [] [] [] []    Stand to Sit [] [] [] [] [] [] [x] [] [] [] []     [] [] [] [] [] [] [] [] [] [] []    I=Independent, Mod I=Modified Independent, S=Supervision, SBA=Standby Assistance, CGA=Contact Guard Assistance,   Min=Minimal Assistance, Mod=Moderate Assistance, Max=Maximal Assistance, Total=Total Assistance, NT=Not Tested    BALANCE: Good Fair+ Fair Fair- Poor NT Comments   Sitting Static [x] [] [] [] [] []    Sitting Dynamic [] [x] [] [] [] []              Standing Static [] [] [x] [] [] []    Standing Dynamic [] [] [x] [] [] []      GAIT: I Mod I S SBA CGA Min Mod Max Total  NT x2 Comments:   Level of Assistance [] [] [] [] [] [] [] [] [] [] []    Distance   feet    DME Rolling Walker    Gait Quality N/A    Weightbearing Status      Stairs      I=Independent, Mod I=Modified Independent, S=Supervision, SBA=Standby Assistance, CGA=Contact Guard Assistance,   Min=Minimal Assistance, Mod=Moderate Assistance, Max=Maximal Assistance, Total=Total Assistance, NT=Not Tested    PLAN:   FREQUENCY AND DURATION: Daily for duration of hospital stay or until stated goals are met, whichever comes first.    TREATMENT:   TREATMENT:   Therapeutic Activity (45 Minutes): Therapeutic activity included Rolling, Supine to Sit, Sit to Supine, Transfer Training, Ambulation on level ground, Sitting balance , and Standing balance to improve functional Activity tolerance, Balance, Coordination, Mobility, and Strength.     TREATMENT GRID:  N/A    AFTER TREATMENT PRECAUTIONS: Bed, Bed/Chair Locked, Call light within reach, Needs within reach, and sitter at bedside    INTERDISCIPLINARY COLLABORATION:  RN/ PCT and OT/ MAYORGA    EDUCATION:      TIME IN/OUT:  Time In: 0950  Time Out: 1035  Minutes: [address] [facility] [address] [place], [place]

## 2022-12-09 NOTE — PROGRESS NOTES
Physician Progress Note      PATIENT:               Amauri Whitmore  CSN #:                  [id number]  :                       1932  ADMIT DATE:       2022 3:18 AM  DISCH DATE:  RESPONDING  PROVIDER #:        Jeramy Metzger MD          QUERY TEXT:    Patient admitted with sepsis, noted to have atrial fibrillation. If possible,   please document in progress notes and discharge summary further specificity   regarding the type of atrial fibrillation: The medical record reflects the following:  Risk Factors: 80 y.o. male with medical history of vascular dementia, AFIB on   apixaban who presented from memory clinic with fever and AMS. In ED, T 102.7    RR 33. He has severe dementia but normally more alert per son  Clinical Indicators: Documented atrial fibrillation  Treatment: Eliquis    Chronic: nonspecific term that could be referring to paroxysmal, persistent,   or permanent  Longstanding persistent: persistent and continuous, lasting > 1 year. Paroxysmal - self-terminating or intermittent; resolves with or without   intervention within 7 days of onset; may recur with various frequency. Persistent - Fails to terminate within 7 days; Often requires meds or   cardioversion to restore to NSR. Permanent - longstanding & persistent; Medication has been ineffective in   restoring NSR &/or cardioversion is contraindicated    Definitions per MS-DRG Training Guide and Quick Reference Guide, [address] 5   Diseases and Disorders of the Circulatory System; 2019; [employer]. Software content   from the [employer]? [employer]    Thank you,  Ellen Anne RN, BSN, CDI  [email or web address]  .   Options provided:  -- Paroxysmal Atrial Fibrillation  -- Longstanding Persistent Atrial Fibrillation  -- Permanent Atrial Fibrillation  -- Persistent Atrial Fibrillation  -- Chronic Atrial Fibrillation, unspecified  -- Other - I will add my own diagnosis  -- Disagree - Not applicable / Not valid  -- Disagree - Clinically unable to determine / Unknown  -- Refer to Clinical Documentation Reviewer    PROVIDER RESPONSE TEXT:    This patient has longstanding persistent atrial fibrillation. Query created by: Lucina Daly on 12/8/2022 4:50 PM      QUERY TEXT:    Pt admitted with sepsis and has encephalopathy documented. If possible, please   document in progress notes and discharge summary further specificity   regarding the type of encephalopathy:    The medical record reflects the following:  Risk Factors:  80 y.o. male with medical history of vascular dementia, AFIB on   apixaban who presented from memory clinic with fever and AMS. In ED, T 102.7    RR 33. He has severe dementia but normally more alert per son. Clinical Indicators: Acute encephalopathy and vascular dementia documented. Acute encephalopathy-Plan:?likely from UTI  1 out of 2 blood cultures for MRSA  Treatment: Zyprexa, Trend urine and BC x2    Thank you,  Ellen Anne RN, BSN, CDI  [email or web address]  . Options provided:  -- Hypertensive encephalopathy  -- Metabolic encephalopathy  -- Septic encephalopathy  -- Vascular dementia only  -- Other - I will add my own diagnosis  -- Disagree - Not applicable / Not valid  -- Disagree - Clinically unable to determine / Unknown  -- Refer to Clinical Documentation Reviewer    PROVIDER RESPONSE TEXT:    This patient has metabolic encephalopathy.     Query created by: Lucina Daly on 12/8/2022 4:57 PM      Electronically signed by:  Antwon Deleon MD 12/9/2022 4:14 PM

## 2022-12-09 NOTE — PROGRESS NOTES
Discharge instructions given and prescriptions reviewed and given to patient. Opportunity for questions and clarification provided. Family verbalizes understanding. Patient discharged to [facility] in stable condition via transport.

## 2022-12-09 NOTE — DISCHARGE INSTRUCTIONS
Sepsis: Care Instructions  Overview     Sepsis is a serious reaction to an infection. It causes inflammation across large areas of the body and can damage tissue and organs. It can lead to extremely low blood pressure. Infections that can lead to sepsis include:  A skin infection such as from a cut. A lung infection like pneumonia. A urinary tract infection. A gut infection such as E. coli. Sepsis is treated with antibiotics. Your doctor will try to find the infection that led to sepsis. Han  also get fluids through a vein (IV). Machines will track your vital signs, including temperature, blood pressure, breathing rate, and pulse rate. The physical and mental effects of sepsis may not be seen for several weeks after treatment. And they may last long after the infection is gone. Physical problems may include:  Feeling weak and tired. Feeling out of breath. Aches and pains. Problems with getting around. Trouble falling asleep or staying asleep. Dry and itchy skin, brittle nails, and hair loss. Some of these effects can lead to problems with your organs or your feet, legs, hands, or arms. Sepsis can also affect your mind and emotions. Problems may include:  Self-doubt. Anxiety. Nightmares. Depression and mood problems. Wanting to avoid other people. Confusion. Flashbacks and bad memories of your illness. It's important to care for yourself and try to avoid infections. This may lower your risk of getting sepsis again. Follow-up care is a key part of your treatment and safety. Be sure to make and go to all appointments, and call your doctor if you are having problems. It's also a good idea to know your test results and keep a list of the medicines you take. How can you care for yourself at home? Be safe with medicines. Take your medicines exactly as prescribed. Call your doctor if you think you are having a problem with your medicine.   If your doctor prescribed antibiotics, take them as directed. Do not stop taking them just because you feel better. You need to take the full course of antibiotics. Help prevent future infections. Avoid infections such as COVID-19, colds, and the flu. Wash your hands often. Stay up to date on your COVID-19 vaccines. And get a flu vaccine every year. Ask your doctor if you need a pneumococcal vaccine (to prevent pneumonia, meningitis, and other infections). If you have had one before, ask your doctor if you need another dose. Clean any wounds or scrapes. Drink plenty of fluids to prevent dehydration. Eat a healthy diet. Include fruits, vegetables, and whole grains in your diet every day. If your doctor recommends it, try doing some physical activity. When should you call for help? Call 911  anytime you think you may need emergency care. For example, call if:    You passed out (lost consciousness). Call your doctor now or seek immediate medical care if:    You have symptoms such as:  Shortness of breath. Feeling very sick. Severe pain. A fast heart rate. Cool, pale, or clammy skin. Feeling confused. Feeling very sleepy, or you are hard to wake up. You are dizzy or lightheaded, or you feel like you may faint. You have a fever or chills. Watch closely for changes in your health, and be sure to contact your doctor if:    You do not get better as expected. Where can you learn more? Go to http://www.woods.com/ and enter T383 to learn more about \"Sepsis: Care Instructions. \"  Current as of: February 9, 2022               Content Version: 13.5  © 2006-2022 Healthwise, [employer]. Care instructions adapted under license by [facility] [address] If you have questions about a medical condition or this instruction, always ask your healthcare professional. [place] [address] any warranty or liability for your use of this information.

## 2022-12-09 NOTE — CARE COORDINATION
Patient care plan reviewed in interdisciplinary rounds with the following disciplines: MD, nursing, case management, therapy, and nutrition services. Patient is medically stable and ready to return to his intermediate/Memory Care Community. Transport arranged for 3PM. Discharge packet prepared. Home health PT and OT orders sent to facility (services offered in house). Home health nursing orders written for wound care. Referral initiated to [facility]. intermediate staff and family are aware of and in agreement with plans. No additional discharge planning needs noted. 12/05/22 0379   Service Assessment   Patient Orientation Unable to Assess  (asleep)   Cognition Dementia / Early Alzheimer's   History Provided By Child/Family   Primary Caregiver Other (Comment)   Accompanied By/Relationship son   PCP Verified by CM Yes   Last Visit to PCP Within last 3 months  (Uses [facility] the Assisted living's inhouse PCP)   Prior Functional Level Independent in ADLs/IADLs   Current Functional Level Independent in ADLs/IADLs   Can patient return to prior living arrangement Yes   Ability to make needs known: Poor   Family able to assist with home care needs: No   Would you like for me to discuss the discharge plan with any other family members/significant others, and if so, who?  Yes   Social/Functional History   Type of Home Assisted living   Discharge Planning   Type of Residence Assisted living   [address]   Patient expects to be discharged to: Assisted living   [address] Discharge   Mode of Transport at Discharge BLS   Confirm Follow Up Transport Family

## 2022-12-10 LAB
BACTERIA SPEC CULT: NORMAL
BACTERIA SPEC CULT: NORMAL
SERVICE CMNT-IMP: NORMAL
SERVICE CMNT-IMP: NORMAL

## 2023-08-13 ENCOUNTER — HOSPITAL ENCOUNTER (EMERGENCY)
Age: 88
Discharge: HOME | End: 2023-08-13
Attending: EMERGENCY MEDICINE
Payer: MEDICARE

## 2023-08-13 ENCOUNTER — APPOINTMENT (OUTPATIENT)
Dept: GENERAL RADIOLOGY | Age: 88
End: 2023-08-13
Payer: MEDICARE

## 2023-08-13 ENCOUNTER — APPOINTMENT (OUTPATIENT)
Dept: CT IMAGING | Age: 88
End: 2023-08-13
Payer: MEDICARE

## 2023-08-13 VITALS
HEART RATE: 104 BPM | SYSTOLIC BLOOD PRESSURE: 110 MMHG | OXYGEN SATURATION: 95 % | BODY MASS INDEX: 23.99 KG/M2 | TEMPERATURE: 98 F | HEIGHT: 69 IN | RESPIRATION RATE: 25 BRPM | DIASTOLIC BLOOD PRESSURE: 79 MMHG | WEIGHT: 162 LBS

## 2023-08-13 DIAGNOSIS — E86.0 DEHYDRATION: ICD-10-CM

## 2023-08-13 DIAGNOSIS — N39.0 URINARY TRACT INFECTION WITHOUT HEMATURIA, SITE UNSPECIFIED: ICD-10-CM

## 2023-08-13 DIAGNOSIS — S09.90XA CLOSED HEAD INJURY, INITIAL ENCOUNTER: ICD-10-CM

## 2023-08-13 DIAGNOSIS — S01.81XA FACIAL LACERATION, INITIAL ENCOUNTER: Primary | ICD-10-CM

## 2023-08-13 DIAGNOSIS — F03.C0 SEVERE DEMENTIA WITHOUT BEHAVIORAL DISTURBANCE, PSYCHOTIC DISTURBANCE, MOOD DISTURBANCE, OR ANXIETY, UNSPECIFIED DEMENTIA TYPE (HCC): ICD-10-CM

## 2023-08-13 LAB
ALBUMIN SERPL-MCNC: 3 G/DL (ref 3.2–4.6)
ALBUMIN/GLOB SERPL: 0.8 (ref 0.4–1.6)
ALP SERPL-CCNC: 104 U/L (ref 50–136)
ALT SERPL-CCNC: 35 U/L (ref 12–65)
ANION GAP SERPL CALC-SCNC: 7 MMOL/L (ref 2–11)
APPEARANCE UR: CLEAR
AST SERPL-CCNC: 36 U/L (ref 15–37)
BACTERIA URNS QL MICRO: ABNORMAL /HPF
BASOPHILS # BLD: 0.1 K/UL (ref 0–0.2)
BASOPHILS NFR BLD: 1 % (ref 0–2)
BILIRUB SERPL-MCNC: 0.4 MG/DL (ref 0.2–1.1)
BILIRUB UR QL: NEGATIVE
BUN SERPL-MCNC: 28 MG/DL (ref 8–23)
CALCIUM SERPL-MCNC: 8.7 MG/DL (ref 8.3–10.4)
CHLORIDE SERPL-SCNC: 111 MMOL/L (ref 101–110)
CO2 SERPL-SCNC: 29 MMOL/L (ref 21–32)
COLOR UR: ABNORMAL
CREAT SERPL-MCNC: 0.8 MG/DL (ref 0.8–1.5)
DIFFERENTIAL METHOD BLD: ABNORMAL
EOSINOPHIL # BLD: 0.1 K/UL (ref 0–0.8)
EOSINOPHIL NFR BLD: 1 % (ref 0.5–7.8)
ERYTHROCYTE [DISTWIDTH] IN BLOOD BY AUTOMATED COUNT: 14.6 % (ref 11.9–14.6)
GLOBULIN SER CALC-MCNC: 3.8 G/DL (ref 2.8–4.5)
GLUCOSE SERPL-MCNC: 130 MG/DL (ref 65–100)
GLUCOSE UR STRIP.AUTO-MCNC: NEGATIVE MG/DL
HCT VFR BLD AUTO: 33.4 % (ref 41.1–50.3)
HGB BLD-MCNC: 10.4 G/DL (ref 13.6–17.2)
HGB UR QL STRIP: ABNORMAL
IMM GRANULOCYTES # BLD AUTO: 0 K/UL (ref 0–0.5)
IMM GRANULOCYTES NFR BLD AUTO: 1 % (ref 0–5)
KETONES UR QL STRIP.AUTO: ABNORMAL MG/DL
LACTATE SERPL-SCNC: 2.3 MMOL/L (ref 0.4–2)
LACTATE SERPL-SCNC: 2.4 MMOL/L (ref 0.4–2)
LEUKOCYTE ESTERASE UR QL STRIP.AUTO: ABNORMAL
LYMPHOCYTES # BLD: 0.9 K/UL (ref 0.5–4.6)
LYMPHOCYTES NFR BLD: 11 % (ref 13–44)
MCH RBC QN AUTO: 28.9 PG (ref 26.1–32.9)
MCHC RBC AUTO-ENTMCNC: 31.1 G/DL (ref 31.4–35)
MCV RBC AUTO: 92.8 FL (ref 82–102)
MONOCYTES # BLD: 0.9 K/UL (ref 0.1–1.3)
MONOCYTES NFR BLD: 11 % (ref 4–12)
NEUTS SEG # BLD: 6.1 K/UL (ref 1.7–8.2)
NEUTS SEG NFR BLD: 75 % (ref 43–78)
NITRITE UR QL STRIP.AUTO: NEGATIVE
NRBC # BLD: 0 K/UL (ref 0–0.2)
PH UR STRIP: 5.5 (ref 5–9)
PLATELET # BLD AUTO: 321 K/UL (ref 150–450)
PMV BLD AUTO: 9.9 FL (ref 9.4–12.3)
POTASSIUM SERPL-SCNC: 4.1 MMOL/L (ref 3.5–5.1)
PROCALCITONIN SERPL-MCNC: 0.07 NG/ML (ref 0–0.49)
PROT SERPL-MCNC: 6.8 G/DL (ref 6.3–8.2)
PROT UR STRIP-MCNC: ABNORMAL MG/DL
RBC # BLD AUTO: 3.6 M/UL (ref 4.23–5.6)
RBC #/AREA URNS HPF: ABNORMAL /HPF
SODIUM SERPL-SCNC: 147 MMOL/L (ref 133–143)
SP GR UR REFRACTOMETRY: 1.03 (ref 1–1.02)
UROBILINOGEN UR QL STRIP.AUTO: 1 EU/DL (ref 0.2–1)
WBC # BLD AUTO: 8.1 K/UL (ref 4.3–11.1)
WBC URNS QL MICRO: ABNORMAL /HPF

## 2023-08-13 PROCEDURE — 87088 URINE BACTERIA CULTURE: CPT

## 2023-08-13 PROCEDURE — 70450 CT HEAD/BRAIN W/O DYE: CPT

## 2023-08-13 PROCEDURE — 12011 RPR F/E/E/N/L/M 2.5 CM/<: CPT

## 2023-08-13 PROCEDURE — 2580000003 HC RX 258: Performed by: EMERGENCY MEDICINE

## 2023-08-13 PROCEDURE — 84145 PROCALCITONIN (PCT): CPT

## 2023-08-13 PROCEDURE — 80053 COMPREHEN METABOLIC PANEL: CPT

## 2023-08-13 PROCEDURE — 72125 CT NECK SPINE W/O DYE: CPT

## 2023-08-13 PROCEDURE — 99285 EMERGENCY DEPT VISIT HI MDM: CPT

## 2023-08-13 PROCEDURE — 93005 ELECTROCARDIOGRAM TRACING: CPT | Performed by: EMERGENCY MEDICINE

## 2023-08-13 PROCEDURE — 2500000003 HC RX 250 WO HCPCS

## 2023-08-13 PROCEDURE — 85025 COMPLETE CBC W/AUTO DIFF WBC: CPT

## 2023-08-13 PROCEDURE — 71045 X-RAY EXAM CHEST 1 VIEW: CPT

## 2023-08-13 PROCEDURE — 87040 BLOOD CULTURE FOR BACTERIA: CPT

## 2023-08-13 PROCEDURE — 87186 SC STD MICRODIL/AGAR DIL: CPT

## 2023-08-13 PROCEDURE — 87086 URINE CULTURE/COLONY COUNT: CPT

## 2023-08-13 PROCEDURE — 6360000002 HC RX W HCPCS: Performed by: EMERGENCY MEDICINE

## 2023-08-13 PROCEDURE — 81001 URINALYSIS AUTO W/SCOPE: CPT

## 2023-08-13 PROCEDURE — 96374 THER/PROPH/DIAG INJ IV PUSH: CPT

## 2023-08-13 PROCEDURE — 83605 ASSAY OF LACTIC ACID: CPT

## 2023-08-13 RX ORDER — CEPHALEXIN 500 MG/1
500 CAPSULE ORAL 3 TIMES DAILY
Qty: 21 CAPSULE | Refills: 0 | Status: SHIPPED | OUTPATIENT
Start: 2023-08-13 | End: 2023-08-20

## 2023-08-13 RX ORDER — LIDOCAINE HYDROCHLORIDE 10 MG/ML
INJECTION, SOLUTION INFILTRATION; PERINEURAL
Status: COMPLETED
Start: 2023-08-13 | End: 2023-08-13

## 2023-08-13 RX ORDER — 0.9 % SODIUM CHLORIDE 0.9 %
500 INTRAVENOUS SOLUTION INTRAVENOUS ONCE
Status: COMPLETED | OUTPATIENT
Start: 2023-08-13 | End: 2023-08-13

## 2023-08-13 RX ORDER — LIDOCAINE HYDROCHLORIDE 10 MG/ML
5 INJECTION, SOLUTION INFILTRATION; PERINEURAL ONCE
Status: COMPLETED | OUTPATIENT
Start: 2023-08-13 | End: 2023-08-13

## 2023-08-13 RX ADMIN — CEFTRIAXONE 1000 MG: 1 INJECTION, POWDER, FOR SOLUTION INTRAMUSCULAR; INTRAVENOUS at 19:23

## 2023-08-13 RX ADMIN — SODIUM CHLORIDE 500 ML: 9 INJECTION, SOLUTION INTRAVENOUS at 18:31

## 2023-08-13 RX ADMIN — LIDOCAINE HYDROCHLORIDE 5 ML: 10 INJECTION, SOLUTION INFILTRATION; PERINEURAL at 15:49

## 2023-08-13 ASSESSMENT — PAIN SCALES - GENERAL: PAINLEVEL_OUTOF10: 10

## 2023-08-13 ASSESSMENT — PAIN - FUNCTIONAL ASSESSMENT: PAIN_FUNCTIONAL_ASSESSMENT: NONE - DENIES PAIN

## 2023-08-13 NOTE — DISCHARGE INSTRUCTIONS
No evidence for sepsis. Patient seems to have a bit of urinary tract infection. Laceration required 3 stitches that need to come out in about 6 to 7 days. Clean laceration gently and apply antibiotic ointment. Imaging of head and neck without acute fractures. Antibiotic until complete. Recheck for high fever, lethargy or other concerns. CT scan shows evidence for old injury and bleeding around the brain but nothing acute. No treatment required according to the neurosurgeons. Lab work shows some dehydration but no evidence for sepsis. CT HEAD WO CONTRAST    Result Date: 8/13/2023  History: Trauma, code stroke Exam: CT head without contrast Technique: Thin section axial CT images were obtained from the skullbase through the vertex. Radiation dose reduction techniques were used for this study. Our CT scanners use one or all of the following: Automated exposure control, adjustment of the mA and/or kV according to patient size, use of iterative reconstruction. COMPARISON: 2/25/2022 Findings: There are new bilateral mixed density extra-axial collections present. On the right, the brain parenchyma is displaced from the inner table by 14 mm. On the left, the brain parenchyma is displaced from the inner table by 12 mm. There is generalized cerebral atrophy. There is a prefrontal scalp hematoma, midline. Chronic appearing white matter change present in the corona radiata and centrum semiovale. There is a question of a nondisplaced nasal bone fracture. 1. New mixed density bilateral subdural collections could represent age-indeterminate hematomas. 2. Midline prefrontal scalp hematoma 3. Question of nondisplaced nasal bone fracture. CT CERVICAL SPINE WO CONTRAST    Result Date: 8/13/2023  CT of the Cervical Spine INDICATION:  Trauma, neck pain Multiple axial images were obtained through the cervical spine without intravenous contrast. Coronal and sagittal reformatted images were also reviewed.   Radiation

## 2023-08-13 NOTE — PROGRESS NOTES
Left message for patient that referral was resent to [facility].    NSR Contact Note    Called by [facility] ED with 79y/o M s/p fall from wheel chair. Dementia at baseline, minimally interactive and somnolent at baseline per report. At stated baseline with son reportedly not desiring aggressive management. NSR consulted for CTH findings. 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 No focal mass effect or MLS, no acute hemorrhage identified. No role for NSR intervention at this time, no acute findings. With 89y/o with baseline dementia, do not feel pt would be a good surgical candidate for zach holes. If pt family desires, can followup with Dr. Eda Gutierrez as outpt, vs followup PCP given reported desire to avoide aggressive intervention, which I feel would be appropriate. No further acute NSR intervention warranted. Call with questions.

## 2023-08-13 NOTE — ED TRIAGE NOTES
Per ems called to [facility] for a fall out of wc. States fell face forward out of wc witnessed. 1.5cm laceration noted to nasal bridge with controlled bleeding. 0.5 laceration noted to left brow with controlled bleeding. No blood thinners. Unknown loc. Facility states at baseline patient is normally somnolent and occasionally verbalizes needs. C-collar in place.  Pupils 4 perrla

## 2023-08-13 NOTE — ED PROVIDER NOTES
Emergency Department Provider Note       PCP: Tisha Brumfield MD   Age: 80 y.o. Sex: male     DISPOSITION Decision To Discharge 08/13/2023 07:31:17 PM       ICD-10-CM    1. Facial laceration, initial encounter  S01.81XA       2. Closed head injury, initial encounter  S09.90XA       3. Urinary tract infection without hematuria, site unspecified  N39.0       4. Severe dementia without behavioral disturbance, psychotic disturbance, mood disturbance, or anxiety, unspecified dementia type ([address])  F03. C0           Medical Decision Making   Nonsyncopal fall with head injury. Needs imaging of head and C-spine. Given history of fever and low blood pressure this morning, will screen for UTI, pneumonia and sepsis. Blood pressure here very reasonable    Complexity of Problems Addressed:  1 or more acute illnesses that pose a threat to life or bodily function. Data Reviewed and Analyzed:   I independently ordered and reviewed each unique test.  I reviewed external records: provider visit note from outside specialist.   The patients assessment required an independent historian: EMS. The reason they were needed is important historical information not provided by the patient. Also history from the son. Due to patient's dementia    Description of fall and mental status from nursing facility  I independently ordered and interpreted the ED EKG in the absence of a Cardiologist.    Rate: 86  EKG Interpretation: EKG Interpretation: atrial fibrillation  ST Segments: Nonspecific ST segments - NO STEMI. Unchanged from previous  I interpreted the X-rays agree with radiologist.  No change in chest x-ray no infiltrates. I interpreted the CT Scan agree with radiologist.  Degenerative changes on C-spine CT. 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 Discussion of management or test interpretation. MAR from facility does not show patient to be on Eliquis.   The management of this patient was discussed with an

## 2023-08-14 LAB
EKG ATRIAL RATE: 151 BPM
EKG DIAGNOSIS: NORMAL
EKG Q-T INTERVAL: 406 MS
EKG QRS DURATION: 153 MS
EKG QTC CALCULATION (BAZETT): 486 MS
EKG R AXIS: 143 DEGREES
EKG T AXIS: 3 DEGREES
EKG VENTRICULAR RATE: 86 BPM

## 2023-08-14 PROCEDURE — 93010 ELECTROCARDIOGRAM REPORT: CPT | Performed by: INTERNAL MEDICINE

## 2023-08-16 LAB
BACTERIA SPEC CULT: ABNORMAL
SERVICE CMNT-IMP: ABNORMAL

## 2023-08-18 LAB
BACTERIA SPEC CULT: NORMAL
SERVICE CMNT-IMP: NORMAL